# Patient Record
Sex: FEMALE | Race: WHITE | NOT HISPANIC OR LATINO
[De-identification: names, ages, dates, MRNs, and addresses within clinical notes are randomized per-mention and may not be internally consistent; named-entity substitution may affect disease eponyms.]

---

## 2018-09-24 ENCOUNTER — RX RENEWAL (OUTPATIENT)
Age: 58
End: 2018-09-24

## 2018-11-13 ENCOUNTER — RECORD ABSTRACTING (OUTPATIENT)
Age: 58
End: 2018-11-13

## 2018-11-13 DIAGNOSIS — F17.200 NICOTINE DEPENDENCE, UNSPECIFIED, UNCOMPLICATED: ICD-10-CM

## 2018-11-14 ENCOUNTER — APPOINTMENT (OUTPATIENT)
Dept: ENDOCRINOLOGY | Facility: CLINIC | Age: 58
End: 2018-11-14
Payer: COMMERCIAL

## 2018-11-14 VITALS
SYSTOLIC BLOOD PRESSURE: 140 MMHG | WEIGHT: 148 LBS | HEART RATE: 87 BPM | HEIGHT: 68 IN | BODY MASS INDEX: 22.43 KG/M2 | DIASTOLIC BLOOD PRESSURE: 80 MMHG

## 2018-11-14 DIAGNOSIS — R73.01 IMPAIRED FASTING GLUCOSE: ICD-10-CM

## 2018-11-14 DIAGNOSIS — E55.9 VITAMIN D DEFICIENCY, UNSPECIFIED: ICD-10-CM

## 2018-11-14 PROCEDURE — 99213 OFFICE O/P EST LOW 20 MIN: CPT

## 2018-11-30 PROBLEM — R73.01 IMPAIRED FASTING GLUCOSE: Status: ACTIVE | Noted: 2018-11-13

## 2018-11-30 PROBLEM — E55.9 VITAMIN D DEFICIENCY: Status: ACTIVE | Noted: 2018-11-13

## 2019-01-02 ENCOUNTER — RX RENEWAL (OUTPATIENT)
Age: 59
End: 2019-01-02

## 2019-04-03 ENCOUNTER — RESULT CHARGE (OUTPATIENT)
Age: 59
End: 2019-04-03

## 2019-05-08 ENCOUNTER — APPOINTMENT (OUTPATIENT)
Dept: ENDOCRINOLOGY | Facility: CLINIC | Age: 59
End: 2019-05-08

## 2019-05-15 ENCOUNTER — APPOINTMENT (OUTPATIENT)
Dept: ENDOCRINOLOGY | Facility: CLINIC | Age: 59
End: 2019-05-15

## 2019-07-22 ENCOUNTER — TRANSCRIPTION ENCOUNTER (OUTPATIENT)
Age: 59
End: 2019-07-22

## 2019-09-15 ENCOUNTER — TRANSCRIPTION ENCOUNTER (OUTPATIENT)
Age: 59
End: 2019-09-15

## 2019-10-07 ENCOUNTER — TRANSCRIPTION ENCOUNTER (OUTPATIENT)
Age: 59
End: 2019-10-07

## 2019-11-12 ENCOUNTER — TRANSCRIPTION ENCOUNTER (OUTPATIENT)
Age: 59
End: 2019-11-12

## 2019-11-19 ENCOUNTER — TRANSCRIPTION ENCOUNTER (OUTPATIENT)
Age: 59
End: 2019-11-19

## 2019-12-15 ENCOUNTER — TRANSCRIPTION ENCOUNTER (OUTPATIENT)
Age: 59
End: 2019-12-15

## 2020-03-11 ENCOUNTER — TRANSCRIPTION ENCOUNTER (OUTPATIENT)
Age: 60
End: 2020-03-11

## 2020-04-20 ENCOUNTER — APPOINTMENT (OUTPATIENT)
Dept: DISASTER EMERGENCY | Facility: CLINIC | Age: 60
End: 2020-04-20
Payer: COMMERCIAL

## 2020-04-20 VITALS
OXYGEN SATURATION: 97 % | DIASTOLIC BLOOD PRESSURE: 82 MMHG | SYSTOLIC BLOOD PRESSURE: 126 MMHG | TEMPERATURE: 98.3 F | HEART RATE: 72 BPM | RESPIRATION RATE: 20 BRPM

## 2020-04-20 DIAGNOSIS — Z86.39 PERSONAL HISTORY OF OTHER ENDOCRINE, NUTRITIONAL AND METABOLIC DISEASE: ICD-10-CM

## 2020-04-20 DIAGNOSIS — Z20.828 CONTACT WITH AND (SUSPECTED) EXPOSURE TO OTHER VIRAL COMMUNICABLE DISEASES: ICD-10-CM

## 2020-04-20 DIAGNOSIS — F17.200 NICOTINE DEPENDENCE, UNSPECIFIED, UNCOMPLICATED: ICD-10-CM

## 2020-04-20 DIAGNOSIS — Z03.818 ENCOUNTER FOR OBSERVATION FOR SUSPECTED EXPOSURE TO OTHER BIOLOGICAL AGENTS RULED OUT: ICD-10-CM

## 2020-04-20 PROCEDURE — 99213 OFFICE O/P EST LOW 20 MIN: CPT

## 2020-04-21 LAB — SARS-COV-2 N GENE NPH QL NAA+PROBE: NOT DETECTED

## 2020-05-13 PROBLEM — Z86.39 HISTORY OF THYROID DISORDER: Status: RESOLVED | Noted: 2020-04-20 | Resolved: 2020-05-13

## 2020-05-13 PROBLEM — Z20.828 SUSPECTED COVID-19 VIRUS INFECTION: Status: ACTIVE | Noted: 2020-04-20

## 2020-05-13 PROBLEM — F17.200 CURRENT SMOKER: Status: ACTIVE | Noted: 2020-04-20

## 2020-05-13 PROBLEM — Z03.818 ENCOUNTER FOR OBSERVATION FOR SUSPECTED EXPOSURE TO OTHER BIOLOGICAL AGENTS RULED OUT: Status: ACTIVE | Noted: 2020-05-13

## 2020-05-13 NOTE — HISTORY OF PRESENT ILLNESS
[Patient presents to the office today for COVID-19 evaluation and testing.] : Patient presents to the office today for COVID-19 evaluation and testing. [Patient has been pre-screened by RN at call center for appointment today with our facility.] : Patient has been pre-screened by RN at call center for appointment today with our facility. [] : no dizziness on standing [None Known] : none known [None] : none [Clear] : clear [moderate] : moderate wheezing [Speaks in full sentences] : speaks in full sentences [Normal O2 sat at rest] : normal O2 sat at rest [Grossly normal, interacts, not tired or weak] : grossly normal, interacts, not tired or weak [COVID-19 testing ordered and specimen obtained] : COVID-19 testing ordered and specimen obtained [FreeTextEntry1] : one month of cough, treated for bronchitis at the time  .  Reports only a cough, no SOB .  Pt. on nebs every four hours. [TextBox_48] : no n/v/d [TextBox_66] : thyroid condition, asthma  [TextBox_78] : limited physical exam  [TextBox_97] : 97% [TextBox_107] : Patient education provided for COVID19.  Explained increased symptoms and SOB ,the patient will go to the ED.  Discussed isolation precautions and handwashing techniques.  Social distancing reviewed.  Utilize Tylenol for fever over 100.4.  No signs of cardiovascular decompensation.  Patient understands the instructions.\par Test results may take up to 3-7 days to return.  Discussed the possibility of false negative results.  Instructed to self quarantine and must remain quarantined x 14 days and no fever for 3 days without antifever medications.  All patients close contacts should also self quarantine.  Social distancing once quarantine is completed.  If the patient has symptoms of chest discomfort, severe shortness of breath at rest, worsening shortness of breath with minimal exertion, new or worsening wheezing, dizziness were instructed to seek immediate medical evaluation.\par \par In addition, she was instructed to call her  PCP for potential asthma treatment.  \par \par \par \par

## 2020-07-21 ENCOUNTER — TRANSCRIPTION ENCOUNTER (OUTPATIENT)
Age: 60
End: 2020-07-21

## 2020-08-11 ENCOUNTER — APPOINTMENT (OUTPATIENT)
Dept: PULMONOLOGY | Facility: CLINIC | Age: 60
End: 2020-08-11
Payer: COMMERCIAL

## 2020-08-11 VITALS
HEIGHT: 68 IN | OXYGEN SATURATION: 96 % | DIASTOLIC BLOOD PRESSURE: 56 MMHG | SYSTOLIC BLOOD PRESSURE: 122 MMHG | BODY MASS INDEX: 22.73 KG/M2 | WEIGHT: 150 LBS | HEART RATE: 76 BPM

## 2020-08-11 DIAGNOSIS — Z86.59 PERSONAL HISTORY OF OTHER MENTAL AND BEHAVIORAL DISORDERS: ICD-10-CM

## 2020-08-11 DIAGNOSIS — Z80.1 FAMILY HISTORY OF MALIGNANT NEOPLASM OF TRACHEA, BRONCHUS AND LUNG: ICD-10-CM

## 2020-08-11 PROCEDURE — G0296 VISIT TO DETERM LDCT ELIG: CPT

## 2020-08-11 PROCEDURE — 99204 OFFICE O/P NEW MOD 45 MIN: CPT | Mod: 25

## 2020-08-11 PROCEDURE — 99406 BEHAV CHNG SMOKING 3-10 MIN: CPT

## 2020-08-11 RX ORDER — PREDNISONE 50 MG/1
50 TABLET ORAL
Qty: 3 | Refills: 0 | Status: DISCONTINUED | COMMUNITY
Start: 2018-11-11 | End: 2020-08-11

## 2020-08-11 RX ORDER — LEVOTHYROXINE SODIUM 0.17 MG/1
TABLET ORAL
Refills: 0 | Status: DISCONTINUED | COMMUNITY
End: 2020-08-11

## 2020-08-11 NOTE — PHYSICAL EXAM
[No Acute Distress] : no acute distress [Normal Oropharynx] : normal oropharynx [No Neck Mass] : no neck mass [Normal Appearance] : normal appearance [Normal Rate/Rhythm] : normal rate/rhythm [Normal S1, S2] : normal s1, s2 [No Murmurs] : no murmurs [No Resp Distress] : no resp distress [No Abnormalities] : no abnormalities [Clear to Auscultation Bilaterally] : clear to auscultation bilaterally [Benign] : benign [Normal Gait] : normal gait [No Clubbing] : no clubbing [No Edema] : no edema [No Focal Deficits] : no focal deficits [Normal Color/ Pigmentation] : normal color/ pigmentation [Oriented x3] : oriented x3 [Normal Affect] : normal affect

## 2020-08-11 NOTE — COUNSELING
[Cessation strategies including cessation program discussed] : Cessation strategies including cessation program discussed [Risk of tobacco use and health benefits of smoking cessation discussed] : Risk of tobacco use and health benefits of smoking cessation discussed [Tobacco Use Cessation Intermediate Greater Than 3 Minutes Up to 10 Minutes] : Tobacco Use Cessation Intermediate Greater Than 3 Minutes Up to 10 Minutes [Use of nicotine replacement therapies and other medications discussed] : Use of nicotine replacement therapies and other medications discussed [ - Annual Lung Cancer Screening/Share Decision Making Discussion] : Annual Lung Cancer Screening/Share Decision Making Discussion. (I have advised this patient to have a Low Dose CT (LDCT) scan of the lungs and have discussed the following with the patient in a shared decision making discussion:   Benefits of Detection and Early Treatment: There is adequate evidence that annual screening for lung cancer with LDCT in a population of high-risk persons can prevent a substantial number of lung cancer–related deaths. The magnitude of benefit depends on the individual patient's risk for lung cancer, as those who are at highest risk are most likely to benefit. Screening cannot prevent most lung cancer–related deaths, and does not replace smoking cessation. Harms of Detection and Early Intervention and Treatment: The harms associated with LDCT screening include false-negative and false-positive results, incidental findings, over diagnosis, and radiation exposure. False-positive LDCT results occur in a substantial proportion of screened persons; 95% of all positive results do not lead to a diagnosis of cancer. In a high-quality screening program, further imaging can resolve most false-positive results; however, some patients may require invasive procedures. Radiation harms, including cancer resulting from cumulative exposure to radiation, vary depending on the age at the start of screening; the number of scans received; and the person's exposure to other sources of radiation, particularly other medical imaging.)

## 2020-08-11 NOTE — CONSULT LETTER
[Consult Letter:] : I had the pleasure of evaluating your patient, [unfilled]. [Dear  ___] : Dear  [unfilled], [Please see my note below.] : Please see my note below. [Sincerely,] : Sincerely, [Consult Closing:] : Thank you very much for allowing me to participate in the care of this patient.  If you have any questions, please do not hesitate to contact me. [DrDominguez  ___] : Dr. SPICER

## 2020-09-17 NOTE — HISTORY OF PRESENT ILLNESS
[TextBox_4] : 30 pack year smoker whose mother  of lung cancer\par Recurrent cough and congestion with HENRIQUEZ\par Responsive to steroids in the past \par CXR on 20: Clear

## 2020-09-18 ENCOUNTER — APPOINTMENT (OUTPATIENT)
Dept: PULMONOLOGY | Facility: CLINIC | Age: 60
End: 2020-09-18

## 2020-09-19 ENCOUNTER — LABORATORY RESULT (OUTPATIENT)
Age: 60
End: 2020-09-19

## 2020-09-19 ENCOUNTER — APPOINTMENT (OUTPATIENT)
Dept: DISASTER EMERGENCY | Facility: CLINIC | Age: 60
End: 2020-09-19

## 2020-09-22 ENCOUNTER — APPOINTMENT (OUTPATIENT)
Dept: PULMONOLOGY | Facility: CLINIC | Age: 60
End: 2020-09-22
Payer: COMMERCIAL

## 2020-09-22 VITALS — WEIGHT: 152 LBS | HEIGHT: 68 IN | BODY MASS INDEX: 23.04 KG/M2

## 2020-09-22 VITALS
RESPIRATION RATE: 16 BRPM | DIASTOLIC BLOOD PRESSURE: 80 MMHG | OXYGEN SATURATION: 96 % | HEART RATE: 83 BPM | SYSTOLIC BLOOD PRESSURE: 124 MMHG

## 2020-09-22 DIAGNOSIS — J30.9 ALLERGIC RHINITIS, UNSPECIFIED: ICD-10-CM

## 2020-09-22 DIAGNOSIS — K21.9 GASTRO-ESOPHAGEAL REFLUX DISEASE W/OUT ESOPHAGITIS: ICD-10-CM

## 2020-09-22 PROCEDURE — 85018 HEMOGLOBIN: CPT | Mod: QW

## 2020-09-22 PROCEDURE — 99214 OFFICE O/P EST MOD 30 MIN: CPT | Mod: 25

## 2020-09-22 PROCEDURE — 94727 GAS DIL/WSHOT DETER LNG VOL: CPT

## 2020-09-22 PROCEDURE — 94729 DIFFUSING CAPACITY: CPT

## 2020-09-22 PROCEDURE — 94010 BREATHING CAPACITY TEST: CPT

## 2020-09-22 RX ORDER — PREDNISONE 20 MG/1
20 TABLET ORAL
Qty: 10 | Refills: 0 | Status: DISCONTINUED | COMMUNITY
Start: 2020-07-21

## 2020-09-22 RX ORDER — AZITHROMYCIN 250 MG/1
250 TABLET, FILM COATED ORAL
Qty: 6 | Refills: 0 | Status: DISCONTINUED | COMMUNITY
Start: 2020-04-21

## 2020-09-22 RX ORDER — BENZONATATE 100 MG/1
100 CAPSULE ORAL
Qty: 30 | Refills: 0 | Status: DISCONTINUED | COMMUNITY
Start: 2020-07-21

## 2020-09-22 NOTE — PHYSICAL EXAM
[No Acute Distress] : no acute distress [Normal Oropharynx] : normal oropharynx [Normal Appearance] : normal appearance [No Neck Mass] : no neck mass [Normal Rate/Rhythm] : normal rate/rhythm [Normal S1, S2] : normal s1, s2 [No Murmurs] : no murmurs [No Resp Distress] : no resp distress [Clear to Auscultation Bilaterally] : clear to auscultation bilaterally [No Abnormalities] : no abnormalities [Benign] : benign [Normal Gait] : normal gait [No Clubbing] : no clubbing [No Edema] : no edema [Normal Color/ Pigmentation] : normal color/ pigmentation [No Focal Deficits] : no focal deficits [Oriented x3] : oriented x3 [Normal Affect] : normal affect

## 2020-09-22 NOTE — CONSULT LETTER
[Dear  ___] : Dear  [unfilled], [Consult Letter:] : I had the pleasure of evaluating your patient, [unfilled]. [Please see my note below.] : Please see my note below. [Consult Closing:] : Thank you very much for allowing me to participate in the care of this patient.  If you have any questions, please do not hesitate to contact me. [Sincerely,] : Sincerely, [DrDominguez  ___] : Dr. SPICER

## 2020-09-22 NOTE — HISTORY OF PRESENT ILLNESS
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

## 2020-11-03 ENCOUNTER — LABORATORY RESULT (OUTPATIENT)
Age: 60
End: 2020-11-03

## 2020-11-03 ENCOUNTER — NON-APPOINTMENT (OUTPATIENT)
Age: 60
End: 2020-11-03

## 2020-11-03 ENCOUNTER — APPOINTMENT (OUTPATIENT)
Dept: FAMILY MEDICINE | Facility: CLINIC | Age: 60
End: 2020-11-03
Payer: COMMERCIAL

## 2020-11-03 VITALS — BODY MASS INDEX: 23.26 KG/M2 | OXYGEN SATURATION: 98 % | TEMPERATURE: 98.4 F | WEIGHT: 153 LBS | HEART RATE: 98 BPM

## 2020-11-03 VITALS — HEART RATE: 92 BPM | DIASTOLIC BLOOD PRESSURE: 70 MMHG | SYSTOLIC BLOOD PRESSURE: 110 MMHG

## 2020-11-03 LAB
BILIRUB UR QL STRIP: NORMAL
GLUCOSE UR-MCNC: NORMAL
HCG UR QL: 0.2 EU/DL
HGB UR QL STRIP.AUTO: NORMAL
KETONES UR-MCNC: NORMAL
LEUKOCYTE ESTERASE UR QL STRIP: NORMAL
NITRITE UR QL STRIP: NORMAL
PH UR STRIP: 5.5
PROT UR STRIP-MCNC: NORMAL
SP GR UR STRIP: 1.03

## 2020-11-03 PROCEDURE — 36415 COLL VENOUS BLD VENIPUNCTURE: CPT

## 2020-11-03 PROCEDURE — 99396 PREV VISIT EST AGE 40-64: CPT | Mod: 25

## 2020-11-03 PROCEDURE — 90686 IIV4 VACC NO PRSV 0.5 ML IM: CPT

## 2020-11-03 PROCEDURE — 81003 URINALYSIS AUTO W/O SCOPE: CPT | Mod: QW

## 2020-11-03 PROCEDURE — 99072 ADDL SUPL MATRL&STAF TM PHE: CPT

## 2020-11-03 PROCEDURE — G0008: CPT

## 2020-11-03 PROCEDURE — 93000 ELECTROCARDIOGRAM COMPLETE: CPT

## 2020-11-03 NOTE — HISTORY OF PRESENT ILLNESS
[FreeTextEntry1] : pt. here for cpe. [de-identified] : pt having pain in both shoulders after massage

## 2020-11-04 ENCOUNTER — APPOINTMENT (OUTPATIENT)
Dept: FAMILY MEDICINE | Facility: CLINIC | Age: 60
End: 2020-11-04
Payer: COMMERCIAL

## 2020-11-04 VITALS — OXYGEN SATURATION: 98 % | TEMPERATURE: 98.2 F | HEART RATE: 102 BPM

## 2020-11-04 LAB
ALBUMIN SERPL ELPH-MCNC: 5.1 G/DL
ALP BLD-CCNC: 93 U/L
ALT SERPL-CCNC: 40 U/L
ANION GAP SERPL CALC-SCNC: 16 MMOL/L
AST SERPL-CCNC: 39 U/L
BASOPHILS # BLD AUTO: 0.04 K/UL
BASOPHILS NFR BLD AUTO: 0.6 %
BILIRUB SERPL-MCNC: 0.5 MG/DL
BUN SERPL-MCNC: 12 MG/DL
CALCIUM SERPL-MCNC: 9.7 MG/DL
CHLORIDE SERPL-SCNC: 102 MMOL/L
CHOLEST SERPL-MCNC: 289 MG/DL
CO2 SERPL-SCNC: 25 MMOL/L
CREAT SERPL-MCNC: 0.94 MG/DL
EOSINOPHIL # BLD AUTO: 0.07 K/UL
EOSINOPHIL NFR BLD AUTO: 1.1 %
ESTIMATED AVERAGE GLUCOSE: 114 MG/DL
GLUCOSE SERPL-MCNC: 113 MG/DL
HBA1C MFR BLD HPLC: 5.6 %
HCT VFR BLD CALC: 46.2 %
HDLC SERPL-MCNC: 61 MG/DL
HGB BLD-MCNC: 14.9 G/DL
IMM GRANULOCYTES NFR BLD AUTO: 0.2 %
LDLC SERPL CALC-MCNC: 192 MG/DL
LYMPHOCYTES # BLD AUTO: 1.55 K/UL
LYMPHOCYTES NFR BLD AUTO: 24.1 %
MAN DIFF?: NORMAL
MCHC RBC-ENTMCNC: 32.3 GM/DL
MCHC RBC-ENTMCNC: 37 PG
MCV RBC AUTO: 114.6 FL
MONOCYTES # BLD AUTO: 0.38 K/UL
MONOCYTES NFR BLD AUTO: 5.9 %
NEUTROPHILS # BLD AUTO: 4.37 K/UL
NEUTROPHILS NFR BLD AUTO: 68.1 %
NONHDLC SERPL-MCNC: 228 MG/DL
PLATELET # BLD AUTO: 212 K/UL
POTASSIUM SERPL-SCNC: 4 MMOL/L
PROT SERPL-MCNC: 7.6 G/DL
RBC # BLD: 4.03 M/UL
RBC # FLD: 12.6 %
SODIUM SERPL-SCNC: 143 MMOL/L
T4 FREE SERPL-MCNC: 0.9 NG/DL
TRIGL SERPL-MCNC: 182 MG/DL
TSH SERPL-ACNC: 20 UIU/ML
WBC # FLD AUTO: 6.42 K/UL

## 2020-11-04 PROCEDURE — 99213 OFFICE O/P EST LOW 20 MIN: CPT | Mod: 25

## 2020-11-04 PROCEDURE — 99072 ADDL SUPL MATRL&STAF TM PHE: CPT

## 2020-12-07 ENCOUNTER — APPOINTMENT (OUTPATIENT)
Dept: FAMILY MEDICINE | Facility: CLINIC | Age: 60
End: 2020-12-07
Payer: COMMERCIAL

## 2020-12-07 VITALS — TEMPERATURE: 98.7 F | OXYGEN SATURATION: 94 % | HEART RATE: 113 BPM

## 2020-12-07 VITALS — DIASTOLIC BLOOD PRESSURE: 70 MMHG | SYSTOLIC BLOOD PRESSURE: 110 MMHG | HEART RATE: 90 BPM

## 2020-12-07 PROCEDURE — 99213 OFFICE O/P EST LOW 20 MIN: CPT

## 2020-12-07 PROCEDURE — 99072 ADDL SUPL MATRL&STAF TM PHE: CPT

## 2020-12-07 NOTE — HISTORY OF PRESENT ILLNESS
[FreeTextEntry1] : pt.  here for thyroid re check [de-identified] : pt still tired slight improvement

## 2020-12-08 ENCOUNTER — LABORATORY RESULT (OUTPATIENT)
Age: 60
End: 2020-12-08

## 2020-12-09 ENCOUNTER — LABORATORY RESULT (OUTPATIENT)
Age: 60
End: 2020-12-09

## 2020-12-09 ENCOUNTER — APPOINTMENT (OUTPATIENT)
Dept: FAMILY MEDICINE | Facility: CLINIC | Age: 60
End: 2020-12-09
Payer: COMMERCIAL

## 2020-12-09 LAB — TSH SERPL-ACNC: 0.09 UIU/ML

## 2020-12-09 PROCEDURE — 36415 COLL VENOUS BLD VENIPUNCTURE: CPT

## 2020-12-09 PROCEDURE — 99072 ADDL SUPL MATRL&STAF TM PHE: CPT

## 2020-12-10 LAB — TSH SERPL-ACNC: 0.05 UIU/ML

## 2020-12-12 ENCOUNTER — EMERGENCY (EMERGENCY)
Facility: HOSPITAL | Age: 60
LOS: 1 days | Discharge: HOME | End: 2020-12-12
Attending: STUDENT IN AN ORGANIZED HEALTH CARE EDUCATION/TRAINING PROGRAM
Payer: COMMERCIAL
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
## 2020-12-12 LAB
ALBUMIN SERPL ELPH-MCNC: 4.3 G/DL — SIGNIFICANT CHANGE UP (ref 3.3–5.2)
ALP SERPL-CCNC: 97 U/L — SIGNIFICANT CHANGE UP (ref 40–120)
ALT FLD-CCNC: 43 U/L — HIGH
ANION GAP SERPL CALC-SCNC: 16 MMOL/L — SIGNIFICANT CHANGE UP (ref 5–17)
ANISOCYTOSIS BLD QL: SLIGHT — SIGNIFICANT CHANGE UP
APTT BLD: 34.5 SEC — SIGNIFICANT CHANGE UP (ref 27.5–35.5)
AST SERPL-CCNC: 38 U/L — HIGH
BASOPHILS # BLD AUTO: 0 K/UL — SIGNIFICANT CHANGE UP (ref 0–0.2)
BASOPHILS NFR BLD AUTO: 0 % — SIGNIFICANT CHANGE UP (ref 0–2)
BILIRUB SERPL-MCNC: <0.2 MG/DL — LOW (ref 0.4–2)
BLD GP AB SCN SERPL QL: SIGNIFICANT CHANGE UP
BUN SERPL-MCNC: 13 MG/DL — SIGNIFICANT CHANGE UP (ref 8–20)
CALCIUM SERPL-MCNC: 8.9 MG/DL — SIGNIFICANT CHANGE UP (ref 8.6–10.2)
CHLORIDE SERPL-SCNC: 108 MMOL/L — HIGH (ref 98–107)
CO2 SERPL-SCNC: 20 MMOL/L — LOW (ref 22–29)
CREAT SERPL-MCNC: 0.77 MG/DL — SIGNIFICANT CHANGE UP (ref 0.5–1.3)
EOSINOPHIL # BLD AUTO: 0.22 K/UL — SIGNIFICANT CHANGE UP (ref 0–0.5)
EOSINOPHIL NFR BLD AUTO: 3.6 % — SIGNIFICANT CHANGE UP (ref 0–6)
ETHANOL SERPL-MCNC: 380 MG/DL — HIGH (ref 0–9)
GLUCOSE SERPL-MCNC: 101 MG/DL — HIGH (ref 70–99)
HCT VFR BLD CALC: 42.5 % — SIGNIFICANT CHANGE UP (ref 34.5–45)
HGB BLD-MCNC: 14.3 G/DL — SIGNIFICANT CHANGE UP (ref 11.5–15.5)
INR BLD: 0.87 RATIO — LOW (ref 0.88–1.16)
LIDOCAIN IGE QN: 81 U/L — HIGH (ref 22–51)
LYMPHOCYTES # BLD AUTO: 1.82 K/UL — SIGNIFICANT CHANGE UP (ref 1–3.3)
LYMPHOCYTES # BLD AUTO: 30.3 % — SIGNIFICANT CHANGE UP (ref 13–44)
MACROCYTES BLD QL: SLIGHT — SIGNIFICANT CHANGE UP
MANUAL SMEAR VERIFICATION: SIGNIFICANT CHANGE UP
MCHC RBC-ENTMCNC: 33.6 GM/DL — SIGNIFICANT CHANGE UP (ref 32–36)
MCHC RBC-ENTMCNC: 37.1 PG — HIGH (ref 27–34)
MCV RBC AUTO: 110.4 FL — HIGH (ref 80–100)
MONOCYTES # BLD AUTO: 0.16 K/UL — SIGNIFICANT CHANGE UP (ref 0–0.9)
MONOCYTES NFR BLD AUTO: 2.7 % — SIGNIFICANT CHANGE UP (ref 2–14)
NEUTROPHILS # BLD AUTO: 3.27 K/UL — SIGNIFICANT CHANGE UP (ref 1.8–7.4)
NEUTROPHILS NFR BLD AUTO: 54.5 % — SIGNIFICANT CHANGE UP (ref 43–77)
PLAT MORPH BLD: NORMAL — SIGNIFICANT CHANGE UP
PLATELET # BLD AUTO: 197 K/UL — SIGNIFICANT CHANGE UP (ref 150–400)
POIKILOCYTOSIS BLD QL AUTO: SLIGHT — SIGNIFICANT CHANGE UP
POLYCHROMASIA BLD QL SMEAR: SLIGHT — SIGNIFICANT CHANGE UP
POTASSIUM SERPL-MCNC: 3.5 MMOL/L — SIGNIFICANT CHANGE UP (ref 3.5–5.3)
POTASSIUM SERPL-SCNC: 3.5 MMOL/L — SIGNIFICANT CHANGE UP (ref 3.5–5.3)
PROT SERPL-MCNC: 7 G/DL — SIGNIFICANT CHANGE UP (ref 6.6–8.7)
PROTHROM AB SERPL-ACNC: 10.2 SEC — LOW (ref 10.6–13.6)
RBC # BLD: 3.85 M/UL — SIGNIFICANT CHANGE UP (ref 3.8–5.2)
RBC # FLD: 11.6 % — SIGNIFICANT CHANGE UP (ref 10.3–14.5)
RBC BLD AUTO: SIGNIFICANT CHANGE UP
ROULEAUX BLD QL SMEAR: PRESENT
SODIUM SERPL-SCNC: 144 MMOL/L — SIGNIFICANT CHANGE UP (ref 135–145)
VARIANT LYMPHS # BLD: 8.9 % — HIGH (ref 0–6)
WBC # BLD: 6 K/UL — SIGNIFICANT CHANGE UP (ref 3.8–10.5)
WBC # FLD AUTO: 6 K/UL — SIGNIFICANT CHANGE UP (ref 3.8–10.5)

## 2020-12-12 PROCEDURE — 86850 RBC ANTIBODY SCREEN: CPT

## 2020-12-12 PROCEDURE — 85610 PROTHROMBIN TIME: CPT

## 2020-12-12 PROCEDURE — 72125 CT NECK SPINE W/O DYE: CPT | Mod: 26

## 2020-12-12 PROCEDURE — 80053 COMPREHEN METABOLIC PANEL: CPT

## 2020-12-12 PROCEDURE — 74177 CT ABD & PELVIS W/CONTRAST: CPT | Mod: 26

## 2020-12-12 PROCEDURE — 12002 RPR S/N/AX/GEN/TRNK2.6-7.5CM: CPT

## 2020-12-12 PROCEDURE — 71045 X-RAY EXAM CHEST 1 VIEW: CPT

## 2020-12-12 PROCEDURE — 74177 CT ABD & PELVIS W/CONTRAST: CPT

## 2020-12-12 PROCEDURE — 83690 ASSAY OF LIPASE: CPT

## 2020-12-12 PROCEDURE — 71260 CT THORAX DX C+: CPT | Mod: 26

## 2020-12-12 PROCEDURE — 85730 THROMBOPLASTIN TIME PARTIAL: CPT

## 2020-12-12 PROCEDURE — 71045 X-RAY EXAM CHEST 1 VIEW: CPT | Mod: 26

## 2020-12-12 PROCEDURE — 80307 DRUG TEST PRSMV CHEM ANLYZR: CPT

## 2020-12-12 PROCEDURE — 86900 BLOOD TYPING SEROLOGIC ABO: CPT

## 2020-12-12 PROCEDURE — 83605 ASSAY OF LACTIC ACID: CPT

## 2020-12-12 PROCEDURE — 36415 COLL VENOUS BLD VENIPUNCTURE: CPT

## 2020-12-12 PROCEDURE — 70450 CT HEAD/BRAIN W/O DYE: CPT | Mod: 26

## 2020-12-12 PROCEDURE — 71260 CT THORAX DX C+: CPT

## 2020-12-12 PROCEDURE — 85025 COMPLETE CBC W/AUTO DIFF WBC: CPT

## 2020-12-12 PROCEDURE — 99285 EMERGENCY DEPT VISIT HI MDM: CPT | Mod: 25

## 2020-12-12 PROCEDURE — 72125 CT NECK SPINE W/O DYE: CPT

## 2020-12-12 PROCEDURE — 70450 CT HEAD/BRAIN W/O DYE: CPT

## 2020-12-12 PROCEDURE — 86901 BLOOD TYPING SEROLOGIC RH(D): CPT

## 2020-12-12 PROCEDURE — 99282 EMERGENCY DEPT VISIT SF MDM: CPT

## 2020-12-12 NOTE — ED PROVIDER NOTE - ATTENDING CONTRIBUTION TO CARE
I performed a face to face history and physical exam of the patient and discussed their management with the student/resident/ACP. I reviewed the student/resident/ACP's note and agree with the documented findings and plan of care.    Pt states that she was at home in her bedroom when she fell. Fall was unwitnessed. Pt admits to drinking but is unsure how she fell. +LOc. no other complaints.    physical - rrr. ctab. abd - soft, mild lower abd ttp. no edema. no rash. neuro intact. R temporal scalp laceration approx. 3 cm. no midline ttp.    plan - trauma team activation. labs and imaging reviewed. laceration stapled. Pt cleared by trauma. will d/c with outpatient f/up.

## 2020-12-12 NOTE — ED PROVIDER NOTE - OBJECTIVE STATEMENT
Pt is a 67 y/o F W/no reported PMHx presents c/o fall w/ALOOB.  Pt was at home drinking rum when she had an unwitnessed fall in her bedroom.  Pt does not recall the fall, denies AC.  Pt was at home with .  Pt tearful during interview, endorses BLLQ pain.

## 2020-12-12 NOTE — ED PROVIDER NOTE - PATIENT PORTAL LINK FT
You can access the FollowMyHealth Patient Portal offered by [facility] by registering at the following website: [email or web address]. By joining [facility]’s FollowMyHealth portal, you will also be able to view your health information using other applications (apps) compatible with our system.

## 2020-12-12 NOTE — H&P ADULT - HISTORY OF PRESENT ILLNESS
Pt is a is a woman in her 50's brought in by ambulance after unwitnessed fall in her bedroom +ETOH, +LOC. Patient comes in GCS 15, ABCS intact, A&OX3, c-collar in place moving all extremities. Pt is a is 67 y/o F  brought in by EMS after unwitnessed fall in her bedroom +ETOH, +LOC, does not recall events. Patient comes in GCS 15, ABC'S intact, A&OX3, c-collar in place moving all extremities. Patient has no complaints of pain, only visible injury small 2 cm laceration to right forehead into scalp. Patient denies cp, sob, sick contacts, recent travel.

## 2020-12-12 NOTE — ED ADULT TRIAGE NOTE - CHIEF COMPLAINT QUOTE
unwitnessed fall hitting head, + LOC, denied blood thinners, abrasion to FA noted. pt in C collar. code trauma B called , pt brought to trauma room

## 2020-12-12 NOTE — ED PROVIDER NOTE - PROGRESS NOTE DETAILS
Pt clinically sober, ambulating.  Pt reassessed, pt feeling better at this time, vss, pt able to walk, talk and vocalized plan of action. Discussed in depth and explained to pt in depth the next steps that need to be taking including proper follow up with PCP or specialists. All incidental findings were discussed with pt as well. Pt verbalized their concerns and all questions were answered. Pt understands dispo and wants discharge. Given good instructions when to return to ED and importance of f/u.

## 2020-12-12 NOTE — ED PROVIDER NOTE - NSFOLLOWUPINSTRUCTIONS_ED_ALL_ED_FT
-- Please follow up with your doctor(s) within the next 3 days, but seek medical attention sooner if your symptoms persist or worsen.  Please call tomorrow for an appointment.  If you cannot follow-up with your primary doctor please return to the ED for any urgent issues.    -- You were given a copy of your labs and imaging.  Please go over these with your doctor(s).     -- If you have any worsening of symptoms or any other concerns please see your doctor or return to the nearest emergency room immediately.    -- Please continue taking your home medications as directed.  Do not use alcohol when taking any medication (especially antibiotics, tylenol or other pain medication) unless you check with the doctor or pharmacist.    **************************************************************************************************************  Do not get the area wet for 2 days. No sports for 2 weeks.    Stitches, Staples, or Adhesive Wound Closure  Doctors use stitches (sutures), staples, and certain glue (skin adhesives) to hold your skin together while it heals (wound closure). You may need this treatment after you have surgery or if you cut your skin accidentally. These methods help your skin heal more quickly. They also make it less likely that you will have a scar.    Terry     When surgical staples are used to close a wound, the edges of your skin on both sides of the wound are brought close together. A staple is placed across the wound, and an instrument secures the edges together. Staples are often used to close surgical cuts (incisions).    Staples are faster to use than sutures, and they cause less reaction from your skin. Staples need to be removed using a tool that bends the staples away from your skin.    How do I care for my wound closure?  Take medicines only as told by your doctor.  If you were prescribed an antibiotic medicine for your wound, finish it all even if you start to feel better.  Use ointments or creams only as told by your doctor.  Wash your hands with soap and water before and after touching your wound.  Do not soak your wound in water. Do not take baths, swim, or use a hot tub until your doctor says it is okay.  Ask your doctor when you can start showering. Cover your wound if told by your doctor.  Do not take out your own sutures or staples.  Do not pick at your wound. Picking can cause an infection.  Keep all follow-up visits as told by your doctor. This is important.  How long will I have my wound closure?  Nonabsorbable sutures and staples must be removed. The location of the wound will determine how long they stay in. This can range from several days to a couple of weeks.  **********************  YOUR WOUND NEEDS FOLLOW UP FOR A WOUND CHECK, STAPLE REMOVAL IN  5-7 DAYS  **********************      Contact your doctor if:    You have a fever.  You have chills.  You have redness, puffiness (swelling), or pain at the site of your wound.  You have fluid, blood, or pus coming from your wound.  There is a bad smell coming from your wound.  The skin edges of your wound start to separate after your sutures have been removed.  Your wound becomes thick, raised, and darker in color after your sutures come out (scarring).    This information is not intended to replace advice given to you by your health care provider. Make sure you discuss any questions you have with your health care provider.

## 2020-12-12 NOTE — H&P ADULT - ATTENDING COMMENTS
I have seen and examined the patient  fall down head lac,  On arrival ABCD intact HD normal  Pan scanned without injuries  Scalp lac repair by ED (help appreciated)  Dispo per Ed

## 2020-12-12 NOTE — ED PROVIDER NOTE - PHYSICAL EXAMINATION
General: well appearing, interactive, well nourished, NAD  HEENT: 3 cm laceration over right forehead in hairline, pupils equal and reactive, normal external ears bilaterally   Cardiac: RRR, no MRG appreciated  Resp: lungs clear to auscultation bilaterally, symmetric chest wall rise  Abd: soft, +ttp RLQ/LLQ, nondistended,   : no CVA tenderness  Neuro: Moving all extremities  Skin:  normal color for race

## 2020-12-12 NOTE — PROGRESS NOTE ADULT - SUBJECTIVE AND OBJECTIVE BOX
Tertiary Trauma Survey (TTS)    Date of TTS: 12-12-20 @ 22:36                             Admit Date: 12-12-20 @ 18:29      Trauma Activation:  Admit GCS: E-     V-     M-     HPI:  Pt is a is 65 y/o F  brought in by EMS after unwitnessed fall in her bedroom +ETOH, +LOC, does not recall events. Patient comes in GCS 15, ABC'S intact, A&OX3, c-collar in place moving all extremities. Patient has no complaints of pain, only visible injury small 2 cm laceration to right forehead into scalp. Patient denies cp, sob, sick contacts, recent travel. (12 Dec 2020 18:48)      PAST MEDICAL & SURGICAL HISTORY:  No pertinent past medical history    No significant past surgical history      [  ] No significant past history as reviewed with the patient and family    Medications (inpatient):   Medications (PRN):  Allergies: Allergy Status Unknown  (Intolerances: )    Vital Signs Last 24 Hrs  T(C): --  T(F): --  HR: --  BP: --  BP(mean): --  RR: --  SpO2: --    Physical Exam:    Neuro: A&OX3, GCS 15     HEENT: scalp laceration repaired with staples (right side of head)     Pulm: no respiratory distress, no conversational dyspnea,     Cardiac:    GI:    Musculoskeletal:    Integumentary:    Vascular:                          14.3   6.00  )-----------( 197      ( 12 Dec 2020 18:39 )             42.5     12-12    144  |  108<H>  |  13.0  ----------------------------<  101<H>  3.5   |  20.0<L>  |  0.77    Ca    8.9      12 Dec 2020 18:39    TPro  7.0  /  Alb  4.3  /  TBili  <0.2<L>  /  DBili  x   /  AST  38<H>  /  ALT  43<H>  /  AlkPhos  97  12-12    PT/INR - ( 12 Dec 2020 18:39 )   PT: 10.2 sec;   INR: 0.87 ratio         PTT - ( 12 Dec 2020 18:39 )  PTT:34.5 sec      List Injuries Identified to Date:    List Operative and Interventional Radiological Procedures:     Consults (Date):  [  ] Neurosurgery   [  ] Orthopedics  [  ] Plastics  [  ] Urology  [  ] PM&R  [  ] Social Work    RADIOLOGICAL FINDINGS REVIEW:  CXR:    Pelvis Films:     C-Spine Films:    T/L/S Spine Films:    Extremity Films:    Head CT:    C-Spine CT:    Neck CT:    Chest CT:    ABD/Pelvis CT:    Other:    Interpretation of Findings:

## 2020-12-12 NOTE — H&P ADULT - NSHPPHYSICALEXAM_GEN_ALL_CORE
Constitutional: Well-developed well nourished Female in no acute distress  HEENT: Head is normocephalic and atraumatic, maxillofacial structures stable, no blood or discharge from nares or oral cavity, no cooney sign / racoon eyes, EOMI b/l, pupils [3 ]mm round and reactive to light b/l, no active drainage or redness, has small 2-3cm laceration to right forehead going to scalp, hemostasis achieved   Neck: cervical collar in place, trachea midline  Respiratory: Breath sounds CTA b/l respirations are unlabored, no accessory muscle use, no conversational dyspnea  Cardiovascular: Regular rate & rhythm, +S1, S1, Chest wall is non-tender to palpation, no subQ emphysema or crepitus palpated  Gastrointestinal: Abdomen soft, non-tender, non-distended, no rebound tenderness / guarding, no ecchymosis or external signs of abdominal trauma  Musculoskeletal: moving all extremities spontaneously, 55 motor strength of b/l Upper and lower extremities. no point tenderness or deformity noted to upper or lower extremities b/l  Pelvis: stable  Vascular: 2+ radial, femoral, and DP pulses b/l  Neurological: GCS: 15 (4/5/6). A&O x 3; no gross sensory / motor / coordination deficits  Neurospinal: no C/T/LS spine tenderness to palpation, no step-offs or signs of external trauma to the back

## 2020-12-12 NOTE — ED PROVIDER NOTE - NS ED ROS FT
CONSTITUTIONAL: No fevers, no chills  Eyes: No vision changes  Cardiovascular: No Chest pain  Respiratory: No SOB  Gastrointestinal: No n/v/c/d, +abd pain  Genitourinary: no dysuria, no hematuria  SKIN: no rashes.  MSK: no weakness, no myalgias, no arthralgias  NEURO: no headache, no weakness, no numbness  PSYCHIATRIC: no SI/HI

## 2020-12-12 NOTE — H&P ADULT - PROBLEM SELECTOR PLAN 1
all scans negative  head laceration repaired by all scans negative  head laceration repaired by ED Attending  tertiary exam negative  c-collar cleared   patient cleared by trauma to be discharged home from ed

## 2020-12-18 ENCOUNTER — TRANSCRIPTION ENCOUNTER (OUTPATIENT)
Age: 60
End: 2020-12-18

## 2021-01-11 ENCOUNTER — LABORATORY RESULT (OUTPATIENT)
Age: 61
End: 2021-01-11

## 2021-01-11 ENCOUNTER — NON-APPOINTMENT (OUTPATIENT)
Age: 61
End: 2021-01-11

## 2021-01-11 ENCOUNTER — APPOINTMENT (OUTPATIENT)
Dept: FAMILY MEDICINE | Facility: CLINIC | Age: 61
End: 2021-01-11
Payer: COMMERCIAL

## 2021-01-11 VITALS
RESPIRATION RATE: 95 BRPM | WEIGHT: 152 LBS | BODY MASS INDEX: 23.04 KG/M2 | HEART RATE: 105 BPM | TEMPERATURE: 97.3 F | HEIGHT: 68 IN

## 2021-01-11 VITALS — DIASTOLIC BLOOD PRESSURE: 70 MMHG | SYSTOLIC BLOOD PRESSURE: 110 MMHG | HEART RATE: 82 BPM

## 2021-01-11 PROCEDURE — 99072 ADDL SUPL MATRL&STAF TM PHE: CPT

## 2021-01-11 PROCEDURE — 99213 OFFICE O/P EST LOW 20 MIN: CPT

## 2021-01-11 NOTE — HISTORY OF PRESENT ILLNESS
[FreeTextEntry1] : pt. here for result and to talk about her medication [de-identified] : pt went to [facility] detox

## 2021-01-12 LAB — TSH SERPL-ACNC: 0.06 UIU/ML

## 2021-01-12 RX ORDER — LEVOTHYROXINE SODIUM 0.15 MG/1
150 TABLET ORAL DAILY
Qty: 90 | Refills: 3 | Status: DISCONTINUED | COMMUNITY
Start: 2018-09-24 | End: 2021-01-12

## 2021-01-12 RX ORDER — LEVOTHYROXINE SODIUM 0.17 MG/1
175 TABLET ORAL
Qty: 90 | Refills: 3 | Status: DISCONTINUED | COMMUNITY
Start: 2021-01-11 | End: 2021-01-12

## 2021-01-12 RX ORDER — LEVOTHYROXINE SODIUM 0.2 MG/1
200 TABLET ORAL
Qty: 30 | Refills: 3 | Status: DISCONTINUED | COMMUNITY
Start: 2020-11-04 | End: 2021-01-12

## 2021-02-09 ENCOUNTER — TRANSCRIPTION ENCOUNTER (OUTPATIENT)
Age: 61
End: 2021-02-09

## 2021-02-25 ENCOUNTER — TRANSCRIPTION ENCOUNTER (OUTPATIENT)
Age: 61
End: 2021-02-25

## 2021-03-20 NOTE — H&P ADULT - TRAUMA TEAM ALERT/CONSULT
Level Two Trauma Activation 3/20/2021 0754  Last data filed at 3/19/2021 2005  Gross per 24 hour   Intake    Output 200 ml   Net -200 ml       Physical Exam Performed:    BP (!) 148/84   Pulse 94   Temp 98 °F (36.7 °C) (Oral)   Resp 18   Ht 5' 9\" (1.753 m)   Wt 143 lb 8.3 oz (65.1 kg)   SpO2 96%   BMI 21.19 kg/m²     General appearance: Alert, sitting in a chair  HEENT: Pupils equal, round, and reactive to light. Conjunctivae/corneas clear. Neck: Supple, with full range of motion. No jugular venous distention. Trachea midline. Respiratory:  Normal respiratory effort. Clear to auscultation  Cardiovascular: Regular rate and rhythm with normal S1/S2  Abdomen: Ostomy in place, Soft, non-tender, non-distended with normal bowel sounds. Musculoskeletal: No clubbing, cyanosis or edema bilaterally. Skin: Skin color, texture, turgor normal.  No rashes or lesions. Neurologic: More alert today but remains confused, oriented to self  Psychiatric: Alert  Capillary Refill: Brisk,< 3 seconds   Peripheral Pulses: +2 palpable, equal bilaterally       Labs:   Recent Labs     03/18/21  0530 03/19/21  0721 03/20/21  0500   WBC 7.0 7.2 6.5   HGB 9.2* 9.7* 10.2*   HCT 28.5* 30.3* 31.0*    299 308     Recent Labs     03/18/21  0530 03/19/21  0721 03/20/21  0500   * 153* 154*   K 4.3 5.0 4.0   * 120* 122*   CO2 22 21 20*   BUN 41* 40* 45*   CREATININE 2.5* 2.5* 2.5*   CALCIUM 8.8 8.9 8.8   PHOS 3.6 3.7 3.8     No results for input(s): AST, ALT, BILIDIR, BILITOT, ALKPHOS in the last 72 hours. No results for input(s): INR in the last 72 hours. No results for input(s): Yepez Javed in the last 72 hours. Urinalysis:      Lab Results   Component Value Date    NITRU Negative 03/12/2021    WBCUA 8 03/12/2021    BACTERIA 4+ 03/12/2021    RBCUA 0-2 03/12/2021    BLOODU MODERATE 03/12/2021    SPECGRAV 1.015 03/12/2021    GLUCOSEU Negative 03/12/2021       Radiology:  XR CHEST PORTABLE   Final Result   Enteric tube as described. XR CHEST PORTABLE   Final Result   Enteric feeding tube projects to the proximal stomach. Other supportive   tubing is in normal position. No acute cardiopulmonary disease. CT Head WO Contrast   Final Result   No acute intracranial abnormality. CT CHEST ABDOMEN PELVIS WO CONTRAST   Final Result   Chest-      Supportive tubing is in normal position. Mild amount of secretions are noted in the right bronchus intermedius. Foci of airspace disease in the right upper and dependent both lower lobes. Pneumonia versus aspiration pneumonitis. ---      Abdomen pelvis-      Findings suggestive of enteritis. Previous bilateral hydroureteronephrosis has resolved. There is a right   lower quadrant ileal conduit with parastomal fluid, herniated portion of the   ileal conduit versus seroma. Suspected gallbladder sludge. Stable sclerotic foci along the right sacroiliac joint and left superior   medial pubis. XR CHEST PORTABLE   Final Result   No acute findings. Assessment/Plan:      Nutrition - Pt not eating or drinking enough to maintain nutritional needs  - Palliative Care discussing with his family. Considering Hospice care    Acute metabolic encephalopathy - the acute aspect has resolved  At baseline. Oriented to self only  Continue low-dose Zyprexa for agitation   Continues off restraints  Continue to monitor    Acute respiratory failure with hypoxia   Resolved. On room air  Extubated on March 14 MRSA pneumonia    Acute kidney injury on CKD  Creatinine stable at 2.5  Continue to trend  Presented at 5.8, down to 2.5 with IV fluids  Currently at/near baseline    Hypernatremia - Sodium high.  Nephrology managing    Sepsis - Resolved  Improved with IV antibiotics and IV fluids  Respiratory culture positive for MRSA    Aspiration pneumonia - resolved  S/p Unasyn and Vancomycin x 7 days total  Speech-language pathology assessing  Family does not want PEG Small bowel obstruction status post partial small bowel resection  Monitor    History of bladder cancer status post ileal conduit  Monitor        DVT Prophylaxis: Heparin  Diet: DIET DYSPHAGIA PUREED; Dysphagia Pureed; Moderately Thick (Honey)  Dietary Nutrition Supplements: Frozen Oral Supplement  Code Status: Full Code    PT/OT Eval Status: 3-5 sessions per week    Dispo - Will need SNF at discharge.  Palliative care consulting    Nuris Morris MD

## 2021-04-28 ENCOUNTER — TRANSCRIPTION ENCOUNTER (OUTPATIENT)
Age: 61
End: 2021-04-28

## 2021-04-30 ENCOUNTER — APPOINTMENT (OUTPATIENT)
Dept: FAMILY MEDICINE | Facility: CLINIC | Age: 61
End: 2021-04-30
Payer: MEDICARE

## 2021-04-30 VITALS — TEMPERATURE: 97.6 F | OXYGEN SATURATION: 97 % | HEART RATE: 86 BPM

## 2021-04-30 DIAGNOSIS — H10.9 UNSPECIFIED CONJUNCTIVITIS: ICD-10-CM

## 2021-04-30 PROCEDURE — 99213 OFFICE O/P EST LOW 20 MIN: CPT

## 2021-04-30 PROCEDURE — 99072 ADDL SUPL MATRL&STAF TM PHE: CPT

## 2021-04-30 RX ORDER — MULTIVIT-MIN/FOLIC/VIT K/LYCOP 400-300MCG
50 MCG TABLET ORAL
Refills: 0 | Status: DISCONTINUED | COMMUNITY
End: 2021-04-30

## 2021-04-30 RX ORDER — ALBUTEROL SULFATE 90 UG/1
108 (90 BASE) AEROSOL, METERED RESPIRATORY (INHALATION)
Qty: 18 | Refills: 0 | Status: DISCONTINUED | COMMUNITY
Start: 2017-11-24 | End: 2021-04-30

## 2021-04-30 RX ORDER — CHLORHEXIDINE GLUCONATE 4 %
1000 LIQUID (ML) TOPICAL
Refills: 0 | Status: DISCONTINUED | COMMUNITY
End: 2021-04-30

## 2021-04-30 NOTE — HISTORY OF PRESENT ILLNESS
[___ Days ago] : [unfilled] days ago [Congestion] : congestion [Cough] : cough [Headache] : headache [Sore Throat] : no sore throat [Wheezing] : no wheezing [Fatigue] : not fatigue [Fever] : no fever [FreeTextEntry1] : pt. went to urgent care still not feeling any better [FreeTextEntry8] : rhinitis conjestion

## 2021-05-14 ENCOUNTER — EMERGENCY (EMERGENCY)
Facility: HOSPITAL | Age: 61
LOS: 1 days | Discharge: HOME | End: 2021-05-14
Attending: EMERGENCY MEDICINE
Payer: COMMERCIAL

## 2021-05-14 VITALS
DIASTOLIC BLOOD PRESSURE: 73 MMHG | HEART RATE: 82 BPM | RESPIRATION RATE: 18 BRPM | HEIGHT: 68 IN | WEIGHT: 158.95 LBS | OXYGEN SATURATION: 93 % | SYSTOLIC BLOOD PRESSURE: 124 MMHG | TEMPERATURE: 98 F

## 2021-05-14 LAB
FLUAV AG NPH QL: SIGNIFICANT CHANGE UP COUNTS
FLUBV AG NPH QL: SIGNIFICANT CHANGE UP COUNTS
RSV RNA NPH QL NAA+NON-PROBE: SIGNIFICANT CHANGE UP COUNTS
SARS-COV-2 RNA SPEC QL NAA+PROBE: SIGNIFICANT CHANGE UP COUNTS

## 2021-05-14 PROCEDURE — 96375 TX/PRO/DX INJ NEW DRUG ADDON: CPT

## 2021-05-14 PROCEDURE — 99285 EMERGENCY DEPT VISIT HI MDM: CPT | Mod: 25

## 2021-05-14 PROCEDURE — 87637 SARSCOV2&INF A&B&RSV AMP PRB: CPT

## 2021-05-14 PROCEDURE — 71045 X-RAY EXAM CHEST 1 VIEW: CPT | Mod: 26

## 2021-05-14 PROCEDURE — 94640 AIRWAY INHALATION TREATMENT: CPT

## 2021-05-14 PROCEDURE — 96374 THER/PROPH/DIAG INJ IV PUSH: CPT

## 2021-05-14 PROCEDURE — 71045 X-RAY EXAM CHEST 1 VIEW: CPT

## 2021-05-14 PROCEDURE — 99284 EMERGENCY DEPT VISIT MOD MDM: CPT

## 2021-05-14 RX ORDER — IPRATROPIUM/ALBUTEROL SULFATE 18-103MCG
3 AEROSOL WITH ADAPTER (GRAM) INHALATION ONCE
Refills: 0 | Status: COMPLETED | OUTPATIENT
Start: 2021-05-14 | End: 2021-05-14

## 2021-05-14 RX ORDER — MAGNESIUM SULFATE 500 MG/ML
2 VIAL (ML) INJECTION ONCE
Refills: 0 | Status: COMPLETED | OUTPATIENT
Start: 2021-05-14 | End: 2021-05-14

## 2021-05-14 RX ORDER — ALBUTEROL 90 UG/1
2 AEROSOL, METERED ORAL
Qty: 1 | Refills: 0
Start: 2021-05-14 | End: 2021-06-12

## 2021-05-14 RX ORDER — ALBUTEROL 90 UG/1
2 AEROSOL, METERED ORAL ONCE
Refills: 0 | Status: COMPLETED | OUTPATIENT
Start: 2021-05-14 | End: 2021-05-14

## 2021-05-14 RX ORDER — ALBUTEROL 90 UG/1
3 AEROSOL, METERED ORAL
Qty: 360 | Refills: 0
Start: 2021-05-14 | End: 2021-06-12

## 2021-05-14 RX ADMIN — Medication 3 MILLILITER(S): at 21:42

## 2021-05-14 RX ADMIN — Medication 125 MILLIGRAM(S): at 21:42

## 2021-05-14 RX ADMIN — Medication 3 MILLILITER(S): at 21:59

## 2021-05-14 RX ADMIN — ALBUTEROL 2 PUFF(S): 90 AEROSOL, METERED ORAL at 23:19

## 2021-05-14 RX ADMIN — Medication 50 GRAM(S): at 21:42

## 2021-05-14 NOTE — ED PROVIDER NOTE - CLINICAL SUMMARY MEDICAL DECISION MAKING FREE TEXT BOX
61 y/o female with history of asthma, no hx of hospitalizations or intubations, smoker presents tot he ED c/o cough, shortness of breath, wheezing for the past 2 days worsening today. minimal expiratory wheeze throughout 61 y/o female with history of asthma, no hx of hospitalizations or intubations, smoker presents tot he ED c/o cough, shortness of breath, wheezing for the past 2 days worsening today. minimal expiratory wheeze throughout, feelign ebtter after meds, satigna t 98% on RA, Pt reassessed, pt feeling better at this time, vss, pt able to walk, talk and vocalized plan of action. Discussed in depth and explained to pt in depth the next steps that need to be taking including proper follow up with PCP or specialists. All incidental findings were discussed with pt as well. Pt verbalized their concerns and all questions were answered. Pt understands dispo and wants discharge. Given good instructions when to return to ED and importance of f/u.

## 2021-05-14 NOTE — ED PROVIDER NOTE - OBJECTIVE STATEMENT
61 y/o female with history of asthma, no hx of hospitalizations or intubations, smoker presents tot he ED c/o cough, shortness of breath, wheezing for the past 2 days worsening today. Notes feels similar to asthma in the past. Has been using albuterol and nebulizer with minimal relief but notes ran out. Admits to chest tightness, productive cough with green sputum. no chest pain, fevers, chills nausea or vomiting, Pt fully vaccinated for covid

## 2021-05-14 NOTE — ED PROVIDER NOTE - EYES, MLM
48 year old male with hemoptysis : with multile comorbidotoes: Has been on eliquis for hx of cva and has PFO Clear bilaterally, pupils equal, round and reactive to light.

## 2021-05-14 NOTE — ED ADULT TRIAGE NOTE - CHIEF COMPLAINT QUOTE
patient with HX of asthma states that she is having trouble breathing ran of Neb treatment coughing SOB/ vaccinated

## 2021-05-14 NOTE — ED PROVIDER NOTE - PATIENT PORTAL LINK FT
You can access the FollowMyHealth Patient Portal offered by [facility] by registering at the following website: [email or web address]. By joining [employer]’s FollowMyHealth portal, you will also be able to view your health information using other applications (apps) compatible with our system.

## 2021-05-14 NOTE — ED PROVIDER NOTE - ATTENDING CONTRIBUTION TO CARE
2/15/21 I, Cristi Kaplan, have personally performed a face to face diagnostic evaluation on this patient. I have reviewed the ACP note and agree with the history, exam and plan of care, except as noted.    61 yo F hx of asthma, no prior admission or intubation, ran out of her inhaler today, p/w asthma exacerbation. Patient is a chronic smoker. On exam, diffuse b/l wheezing, mild tachypnia, no abdominal breathing, no retraction. Patient given duobneb x 2. magensiu, solumedrol with improvement.

## 2021-06-16 PROBLEM — J45.909 UNSPECIFIED ASTHMA, UNCOMPLICATED: Chronic | Status: ACTIVE | Noted: 2021-05-14

## 2021-06-23 ENCOUNTER — APPOINTMENT (OUTPATIENT)
Dept: FAMILY MEDICINE | Facility: CLINIC | Age: 61
End: 2021-06-23
Payer: COMMERCIAL

## 2021-06-23 VITALS — OXYGEN SATURATION: 96 % | HEART RATE: 96 BPM | TEMPERATURE: 97.9 F

## 2021-06-23 PROCEDURE — 99213 OFFICE O/P EST LOW 20 MIN: CPT

## 2021-06-23 PROCEDURE — 99072 ADDL SUPL MATRL&STAF TM PHE: CPT

## 2021-07-26 ENCOUNTER — TRANSCRIPTION ENCOUNTER (OUTPATIENT)
Age: 61
End: 2021-07-26

## 2021-08-25 ENCOUNTER — TRANSCRIPTION ENCOUNTER (OUTPATIENT)
Age: 61
End: 2021-08-25

## 2021-10-13 ENCOUNTER — APPOINTMENT (OUTPATIENT)
Dept: FAMILY MEDICINE | Facility: CLINIC | Age: 61
End: 2021-10-13
Payer: COMMERCIAL

## 2021-10-13 ENCOUNTER — LABORATORY RESULT (OUTPATIENT)
Age: 61
End: 2021-10-13

## 2021-10-13 VITALS
RESPIRATION RATE: 14 BRPM | TEMPERATURE: 97.5 F | HEART RATE: 95 BPM | WEIGHT: 165 LBS | BODY MASS INDEX: 25.09 KG/M2 | OXYGEN SATURATION: 96 %

## 2021-10-13 VITALS — DIASTOLIC BLOOD PRESSURE: 70 MMHG | HEART RATE: 84 BPM | SYSTOLIC BLOOD PRESSURE: 110 MMHG

## 2021-10-13 DIAGNOSIS — R06.2 WHEEZING: ICD-10-CM

## 2021-10-13 DIAGNOSIS — R05.9 COUGH, UNSPECIFIED: ICD-10-CM

## 2021-10-13 PROCEDURE — 99396 PREV VISIT EST AGE 40-64: CPT | Mod: 25

## 2021-10-13 PROCEDURE — 36415 COLL VENOUS BLD VENIPUNCTURE: CPT

## 2021-10-13 PROCEDURE — 81003 URINALYSIS AUTO W/O SCOPE: CPT | Mod: QW

## 2021-10-13 NOTE — PHYSICAL EXAM
[No Acute Distress] : no acute distress [Well Nourished] : well nourished [Well Developed] : well developed [Well-Appearing] : well-appearing [Normal Sclera/Conjunctiva] : normal sclera/conjunctiva [PERRL] : pupils equal round and reactive to light [EOMI] : extraocular movements intact [Normal Outer Ear/Nose] : the outer ears and nose were normal in appearance [Normal Oropharynx] : the oropharynx was normal [No JVD] : no jugular venous distention [No Lymphadenopathy] : no lymphadenopathy [Supple] : supple [Thyroid Normal, No Nodules] : the thyroid was normal and there were no nodules present [No Respiratory Distress] : no respiratory distress  [No Accessory Muscle Use] : no accessory muscle use [Normal Rate] : normal rate  [Regular Rhythm] : with a regular rhythm [Normal S1, S2] : normal S1 and S2 [No Murmur] : no murmur heard [No Carotid Bruits] : no carotid bruits [No Abdominal Bruit] : a ~M bruit was not heard ~T in the abdomen [No Varicosities] : no varicosities [Pedal Pulses Present] : the pedal pulses are present [No Edema] : there was no peripheral edema [No Palpable Aorta] : no palpable aorta [No Extremity Clubbing/Cyanosis] : no extremity clubbing/cyanosis [Soft] : abdomen soft [Non Tender] : non-tender [Non-distended] : non-distended [No Masses] : no abdominal mass palpated [No HSM] : no HSM [Normal Bowel Sounds] : normal bowel sounds [Normal Posterior Cervical Nodes] : no posterior cervical lymphadenopathy [Normal Anterior Cervical Nodes] : no anterior cervical lymphadenopathy [No CVA Tenderness] : no CVA  tenderness [No Spinal Tenderness] : no spinal tenderness [No Joint Swelling] : no joint swelling [Grossly Normal Strength/Tone] : grossly normal strength/tone [No Rash] : no rash [Coordination Grossly Intact] : coordination grossly intact [No Focal Deficits] : no focal deficits [Normal Gait] : normal gait [Deep Tendon Reflexes (DTR)] : deep tendon reflexes were 2+ and symmetric [Normal Affect] : the affect was normal [Normal Insight/Judgement] : insight and judgment were intact [de-identified] : slight expiratory wheeze

## 2021-10-14 LAB
BILIRUB UR QL STRIP: NORMAL
ESTIMATED AVERAGE GLUCOSE: 120 MG/DL
GLUCOSE UR-MCNC: NORMAL
HBA1C MFR BLD HPLC: 5.8 %
HCG UR QL: 0.2 EU/DL
HGB UR QL STRIP.AUTO: NORMAL
KETONES UR-MCNC: NORMAL
LEUKOCYTE ESTERASE UR QL STRIP: NORMAL
NITRITE UR QL STRIP: NORMAL
PH UR STRIP: 6
PROT UR STRIP-MCNC: NORMAL
SP GR UR STRIP: 1.01
T4 FREE SERPL-MCNC: 0.8 NG/DL
TSH SERPL-ACNC: 24.8 UIU/ML

## 2021-10-19 ENCOUNTER — TRANSCRIPTION ENCOUNTER (OUTPATIENT)
Age: 61
End: 2021-10-19

## 2021-10-19 ENCOUNTER — NON-APPOINTMENT (OUTPATIENT)
Age: 61
End: 2021-10-19

## 2021-10-19 LAB
ALBUMIN SERPL ELPH-MCNC: 4.8 G/DL
ALP BLD-CCNC: 118 U/L
ALT SERPL-CCNC: 30 U/L
ANION GAP SERPL CALC-SCNC: 18 MMOL/L
AST SERPL-CCNC: 27 U/L
BASOPHILS # BLD AUTO: 0.04 K/UL
BASOPHILS NFR BLD AUTO: 0.7 %
BILIRUB SERPL-MCNC: <0.2 MG/DL
BUN SERPL-MCNC: 19 MG/DL
CALCIUM SERPL-MCNC: 9.1 MG/DL
CHLORIDE SERPL-SCNC: 104 MMOL/L
CHOLEST SERPL-MCNC: 285 MG/DL
CO2 SERPL-SCNC: 19 MMOL/L
CREAT SERPL-MCNC: 0.96 MG/DL
EOSINOPHIL # BLD AUTO: 0.12 K/UL
EOSINOPHIL NFR BLD AUTO: 2 %
GLUCOSE SERPL-MCNC: 98 MG/DL
HCT VFR BLD CALC: 44.2 %
HDLC SERPL-MCNC: 44 MG/DL
HGB BLD-MCNC: 13.9 G/DL
IMM GRANULOCYTES NFR BLD AUTO: 0.2 %
LDLC SERPL CALC-MCNC: NORMAL MG/DL
LYMPHOCYTES # BLD AUTO: 1.88 K/UL
LYMPHOCYTES NFR BLD AUTO: 32.1 %
MAN DIFF?: NORMAL
MCHC RBC-ENTMCNC: 31.4 GM/DL
MCHC RBC-ENTMCNC: 34 PG
MCV RBC AUTO: 108.1 FL
MONOCYTES # BLD AUTO: 0.39 K/UL
MONOCYTES NFR BLD AUTO: 6.7 %
NEUTROPHILS # BLD AUTO: 3.42 K/UL
NEUTROPHILS NFR BLD AUTO: 58.3 %
NONHDLC SERPL-MCNC: 241 MG/DL
PLATELET # BLD AUTO: 240 K/UL
POTASSIUM SERPL-SCNC: 3.8 MMOL/L
PROT SERPL-MCNC: 7 G/DL
RBC # BLD: 4.09 M/UL
RBC # FLD: 12.9 %
SODIUM SERPL-SCNC: 142 MMOL/L
TRIGL SERPL-MCNC: 480 MG/DL
WBC # FLD AUTO: 5.86 K/UL

## 2021-10-27 ENCOUNTER — APPOINTMENT (OUTPATIENT)
Dept: FAMILY MEDICINE | Facility: CLINIC | Age: 61
End: 2021-10-27
Payer: COMMERCIAL

## 2021-10-27 VITALS — HEART RATE: 88 BPM | TEMPERATURE: 97.9 F | OXYGEN SATURATION: 94 %

## 2021-10-27 DIAGNOSIS — E78.2 MIXED HYPERLIPIDEMIA: ICD-10-CM

## 2021-10-27 PROCEDURE — G0008: CPT

## 2021-10-27 PROCEDURE — 99213 OFFICE O/P EST LOW 20 MIN: CPT | Mod: 25

## 2021-10-27 PROCEDURE — 90686 IIV4 VACC NO PRSV 0.5 ML IM: CPT

## 2021-10-27 NOTE — PHYSICAL EXAM
[No Acute Distress] : no acute distress [Well Nourished] : well nourished [Well Developed] : well developed [Well-Appearing] : well-appearing [Normal Sclera/Conjunctiva] : normal sclera/conjunctiva [PERRL] : pupils equal round and reactive to light [EOMI] : extraocular movements intact [Normal Outer Ear/Nose] : the outer ears and nose were normal in appearance [Normal Oropharynx] : the oropharynx was normal [No JVD] : no jugular venous distention [No Lymphadenopathy] : no lymphadenopathy [Supple] : supple [Thyroid Normal, No Nodules] : the thyroid was normal and there were no nodules present [No Respiratory Distress] : no respiratory distress  [No Accessory Muscle Use] : no accessory muscle use [Clear to Auscultation] : lungs were clear to auscultation bilaterally [Normal Rate] : normal rate  [Regular Rhythm] : with a regular rhythm [Normal S1, S2] : normal S1 and S2 [No Murmur] : no murmur heard [No Carotid Bruits] : no carotid bruits [No Abdominal Bruit] : a ~M bruit was not heard ~T in the abdomen [No Varicosities] : no varicosities [Pedal Pulses Present] : the pedal pulses are present [No Edema] : there was no peripheral edema [No Palpable Aorta] : no palpable aorta [No Extremity Clubbing/Cyanosis] : no extremity clubbing/cyanosis [Soft] : abdomen soft [Non Tender] : non-tender [Non-distended] : non-distended [No Masses] : no abdominal mass palpated [No HSM] : no HSM [Normal Bowel Sounds] : normal bowel sounds [Normal Posterior Cervical Nodes] : no posterior cervical lymphadenopathy [Normal Anterior Cervical Nodes] : no anterior cervical lymphadenopathy [No CVA Tenderness] : no CVA  tenderness [No Spinal Tenderness] : no spinal tenderness [No Joint Swelling] : no joint swelling [Grossly Normal Strength/Tone] : grossly normal strength/tone [No Rash] : no rash [Coordination Grossly Intact] : coordination grossly intact [No Focal Deficits] : no focal deficits [Normal Gait] : normal gait [Deep Tendon Reflexes (DTR)] : deep tendon reflexes were 2+ and symmetric [Normal Affect] : the affect was normal [Normal Insight/Judgement] : insight and judgment were intact [de-identified] : clear no wheeze

## 2021-11-12 ENCOUNTER — APPOINTMENT (OUTPATIENT)
Dept: FAMILY MEDICINE | Facility: CLINIC | Age: 61
End: 2021-11-12

## 2021-11-24 ENCOUNTER — APPOINTMENT (OUTPATIENT)
Dept: FAMILY MEDICINE | Facility: CLINIC | Age: 61
End: 2021-11-24
Payer: COMMERCIAL

## 2021-11-24 VITALS — OXYGEN SATURATION: 95 % | TEMPERATURE: 98.6 F | HEART RATE: 86 BPM

## 2021-11-24 PROCEDURE — 36415 COLL VENOUS BLD VENIPUNCTURE: CPT

## 2021-11-24 PROCEDURE — 99214 OFFICE O/P EST MOD 30 MIN: CPT | Mod: 25

## 2021-11-24 NOTE — HISTORY OF PRESENT ILLNESS
[FreeTextEntry1] : pt. here for thyroid check [de-identified] : discussed etoh will repeat thyroid

## 2021-11-29 LAB — TSH SERPL-ACNC: 0.45 UIU/ML

## 2021-12-03 ENCOUNTER — APPOINTMENT (OUTPATIENT)
Dept: FAMILY MEDICINE | Facility: CLINIC | Age: 61
End: 2021-12-03

## 2022-01-23 ENCOUNTER — TRANSCRIPTION ENCOUNTER (OUTPATIENT)
Age: 62
End: 2022-01-23

## 2022-02-01 ENCOUNTER — TRANSCRIPTION ENCOUNTER (OUTPATIENT)
Age: 62
End: 2022-02-01

## 2022-02-01 ENCOUNTER — INPATIENT (INPATIENT)
Facility: HOSPITAL | Age: 62
LOS: 1 days | Discharge: HOME | DRG: 113 | End: 2022-02-03
Attending: STUDENT IN AN ORGANIZED HEALTH CARE EDUCATION/TRAINING PROGRAM | Admitting: STUDENT IN AN ORGANIZED HEALTH CARE EDUCATION/TRAINING PROGRAM
Payer: COMMERCIAL

## 2022-02-01 VITALS
HEART RATE: 81 BPM | OXYGEN SATURATION: 97 % | TEMPERATURE: 98 F | RESPIRATION RATE: 18 BRPM | SYSTOLIC BLOOD PRESSURE: 86 MMHG | WEIGHT: 160.06 LBS | HEIGHT: 68 IN | DIASTOLIC BLOOD PRESSURE: 50 MMHG

## 2022-02-01 LAB
ALBUMIN SERPL ELPH-MCNC: 4.7 G/DL — SIGNIFICANT CHANGE UP (ref 3.3–5.2)
ALP SERPL-CCNC: 114 U/L — SIGNIFICANT CHANGE UP (ref 40–120)
ALT FLD-CCNC: 38 U/L — HIGH
ANION GAP SERPL CALC-SCNC: 18 MMOL/L — HIGH (ref 5–17)
AST SERPL-CCNC: 34 U/L — HIGH
BASE EXCESS BLDV CALC-SCNC: 1 MMOL/L — SIGNIFICANT CHANGE UP (ref -2–3)
BASOPHILS # BLD AUTO: 0.03 K/UL — SIGNIFICANT CHANGE UP (ref 0–0.2)
BASOPHILS NFR BLD AUTO: 0.4 % — SIGNIFICANT CHANGE UP (ref 0–2)
BILIRUB SERPL-MCNC: <0.2 MG/DL — LOW (ref 0.4–2)
BUN SERPL-MCNC: 19.2 MG/DL — SIGNIFICANT CHANGE UP (ref 8–20)
CA-I SERPL-SCNC: 1.09 MMOL/L — LOW (ref 1.15–1.33)
CALCIUM SERPL-MCNC: 9.3 MG/DL — SIGNIFICANT CHANGE UP (ref 8.6–10.2)
CHLORIDE BLDV-SCNC: 105 MMOL/L — SIGNIFICANT CHANGE UP (ref 98–107)
CHLORIDE SERPL-SCNC: 105 MMOL/L — SIGNIFICANT CHANGE UP (ref 98–107)
CO2 SERPL-SCNC: 23 MMOL/L — SIGNIFICANT CHANGE UP (ref 22–29)
CREAT SERPL-MCNC: 0.96 MG/DL — SIGNIFICANT CHANGE UP (ref 0.5–1.3)
EOSINOPHIL # BLD AUTO: 0.12 K/UL — SIGNIFICANT CHANGE UP (ref 0–0.5)
EOSINOPHIL NFR BLD AUTO: 1.7 % — SIGNIFICANT CHANGE UP (ref 0–6)
ETHANOL SERPL-MCNC: 63 MG/DL — HIGH (ref 0–9)
GAS PNL BLDV: 143 MMOL/L — SIGNIFICANT CHANGE UP (ref 136–145)
GAS PNL BLDV: SIGNIFICANT CHANGE UP
GLUCOSE BLDV-MCNC: 122 MG/DL — HIGH (ref 70–99)
GLUCOSE SERPL-MCNC: 134 MG/DL — HIGH (ref 70–99)
HCO3 BLDV-SCNC: 27 MMOL/L — SIGNIFICANT CHANGE UP (ref 22–29)
HCT VFR BLD CALC: 43.7 % — SIGNIFICANT CHANGE UP (ref 34.5–45)
HCT VFR BLDA CALC: 41 % — SIGNIFICANT CHANGE UP (ref 34–46)
HGB BLD CALC-MCNC: 13.5 G/DL — SIGNIFICANT CHANGE UP (ref 11.7–16.1)
HGB BLD-MCNC: 14.3 G/DL — SIGNIFICANT CHANGE UP (ref 11.5–15.5)
IMM GRANULOCYTES NFR BLD AUTO: 0.6 % — SIGNIFICANT CHANGE UP (ref 0–1.5)
LACTATE BLDV-MCNC: 2.5 MMOL/L — HIGH (ref 0.5–2)
LIDOCAIN IGE QN: 61 U/L — HIGH (ref 22–51)
LYMPHOCYTES # BLD AUTO: 2.58 K/UL — SIGNIFICANT CHANGE UP (ref 1–3.3)
LYMPHOCYTES # BLD AUTO: 37.3 % — SIGNIFICANT CHANGE UP (ref 13–44)
MAGNESIUM SERPL-MCNC: 2.2 MG/DL — SIGNIFICANT CHANGE UP (ref 1.6–2.6)
MCHC RBC-ENTMCNC: 32.7 GM/DL — SIGNIFICANT CHANGE UP (ref 32–36)
MCHC RBC-ENTMCNC: 34 PG — SIGNIFICANT CHANGE UP (ref 27–34)
MCV RBC AUTO: 104 FL — HIGH (ref 80–100)
MONOCYTES # BLD AUTO: 0.42 K/UL — SIGNIFICANT CHANGE UP (ref 0–0.9)
MONOCYTES NFR BLD AUTO: 6.1 % — SIGNIFICANT CHANGE UP (ref 2–14)
NEUTROPHILS # BLD AUTO: 3.73 K/UL — SIGNIFICANT CHANGE UP (ref 1.8–7.4)
NEUTROPHILS NFR BLD AUTO: 53.9 % — SIGNIFICANT CHANGE UP (ref 43–77)
NT-PROBNP SERPL-SCNC: 20 PG/ML — SIGNIFICANT CHANGE UP (ref 0–300)
PCO2 BLDV: 51 MMHG — HIGH (ref 39–42)
PH BLDV: 7.33 — SIGNIFICANT CHANGE UP (ref 7.32–7.43)
PLATELET # BLD AUTO: 276 K/UL — SIGNIFICANT CHANGE UP (ref 150–400)
PO2 BLDV: 49 MMHG — HIGH (ref 25–45)
POTASSIUM BLDV-SCNC: 3.6 MMOL/L — SIGNIFICANT CHANGE UP (ref 3.5–5.1)
POTASSIUM SERPL-MCNC: 3.4 MMOL/L — LOW (ref 3.5–5.3)
POTASSIUM SERPL-SCNC: 3.4 MMOL/L — LOW (ref 3.5–5.3)
PROT SERPL-MCNC: 7.3 G/DL — SIGNIFICANT CHANGE UP (ref 6.6–8.7)
RBC # BLD: 4.2 M/UL — SIGNIFICANT CHANGE UP (ref 3.8–5.2)
RBC # FLD: 13 % — SIGNIFICANT CHANGE UP (ref 10.3–14.5)
SAO2 % BLDV: 77.6 % — SIGNIFICANT CHANGE UP
SODIUM SERPL-SCNC: 146 MMOL/L — HIGH (ref 135–145)
TROPONIN T SERPL-MCNC: <0.01 NG/ML — SIGNIFICANT CHANGE UP (ref 0–0.06)
WBC # BLD: 6.92 K/UL — SIGNIFICANT CHANGE UP (ref 3.8–10.5)
WBC # FLD AUTO: 6.92 K/UL — SIGNIFICANT CHANGE UP (ref 3.8–10.5)

## 2022-02-01 PROCEDURE — 71045 X-RAY EXAM CHEST 1 VIEW: CPT | Mod: 26

## 2022-02-01 PROCEDURE — 99285 EMERGENCY DEPT VISIT HI MDM: CPT

## 2022-02-01 PROCEDURE — 93010 ELECTROCARDIOGRAM REPORT: CPT

## 2022-02-01 RX ORDER — ALBUTEROL 90 UG/1
2 AEROSOL, METERED ORAL
Refills: 0 | Status: DISCONTINUED | OUTPATIENT
Start: 2022-02-01 | End: 2022-02-02

## 2022-02-01 RX ORDER — ONDANSETRON 8 MG/1
4 TABLET, FILM COATED ORAL ONCE
Refills: 0 | Status: COMPLETED | OUTPATIENT
Start: 2022-02-01 | End: 2022-02-01

## 2022-02-01 RX ORDER — SODIUM CHLORIDE 9 MG/ML
1000 INJECTION INTRAMUSCULAR; INTRAVENOUS; SUBCUTANEOUS ONCE
Refills: 0 | Status: COMPLETED | OUTPATIENT
Start: 2022-02-01 | End: 2022-02-01

## 2022-02-01 RX ADMIN — ONDANSETRON 4 MILLIGRAM(S): 8 TABLET, FILM COATED ORAL at 23:15

## 2022-02-01 RX ADMIN — ALBUTEROL 2 PUFF(S): 90 AEROSOL, METERED ORAL at 22:30

## 2022-02-01 RX ADMIN — SODIUM CHLORIDE 1000 MILLILITER(S): 9 INJECTION INTRAMUSCULAR; INTRAVENOUS; SUBCUTANEOUS at 22:30

## 2022-02-01 RX ADMIN — Medication 125 MILLIGRAM(S): at 22:30

## 2022-02-01 NOTE — ED PROVIDER NOTE - NSICDXPASTMEDICALHX_GEN_ALL_CORE_FT
PAST MEDICAL HISTORY:  Asthma       Alcohol abuse     Chronic obstructive pulmonary disease (COPD)

## 2022-02-01 NOTE — ED PROVIDER NOTE - CARE PLAN
Principal Discharge DX:	Syncope  Secondary Diagnosis:	COPD exacerbation   1 Principal Discharge DX:	Fracture of inferior orbital wall  Secondary Diagnosis:	COPD exacerbation  Secondary Diagnosis:	Syncope

## 2022-02-01 NOTE — ED ADULT TRIAGE NOTE - CHIEF COMPLAINT QUOTE
pt is A&O4 c/o dizziness and syncopized ,as per family member pt had seizures activity   MD Rasheed called at bedside

## 2022-02-01 NOTE — ED PROVIDER NOTE - PROGRESS NOTE DETAILS
Patient feeling better. Patient likely with seizure episode secondary to alcohol use as patient has history of multiple seizures during withdrawal period. Patient declining librium stating she has not drank alcohol contrary to labs. Will continue to observe.  BP is as noted, however, patient has a chronic history of hypotension that she is currently observing not requiring medication. Wheezing has improved significantly. Patient feeling much better. CT head and repeat labs pending. If negative patient to [facility] is as noted. Patient with pain with upward movement of the globe however, remainder of the examination is normal. Discussed case with Dr. Conklin whom reviewed the findings and request decadron 10 mg q12 hours and admission by trauma team. Trauma consult placed.

## 2022-02-01 NOTE — ED PROVIDER NOTE - CROS ED ENMT ALL NEG
negative... Glycopyrrolate Pregnancy And Lactation Text: This medication is Pregnancy Category B and is considered safe during pregnancy. It is unknown if it is excreted breast milk.

## 2022-02-01 NOTE — ED PROVIDER NOTE - CLINICAL SUMMARY MEDICAL DECISION MAKING FREE TEXT BOX
pt with episode of near syncope/syncope of unclear origin, also has active COPD exacerbation probably related to wheezing, order labs, CT bronchodilators and hydration.

## 2022-02-01 NOTE — ED PROVIDER NOTE - OBJECTIVE STATEMENT
62 y/o female with PMHx of asthma, COPD, alcohol abuse presents to the ED after syncopal/near syncopal episode. Pt stood up to get water from the fridge and had syncopal episode and per EMS pt  reported seizure like activity, did have incontinence, but states she remembers everything. Pt currently states she feels dizzy and has headache, denies hitting her head. Pt states over the past few days she has had sore throat, cough. Pt has been using Albuterol treatments at home. Pt notes she did have one prior seizure in past, pt states she has not drank in over a month, goes to AA regularly. Pt full vaccinated against covid 62 y/o female with PMHx of asthma, COPD, alcohol abuse presents to the ED after syncopal/near syncopal episode. Around 21:30 pt stood up to get water from the fridge and had syncopal episode and per EMS pt  reported seizure like activity, did have incontinence, but states she remembers everything. Pt currently states she feels dizzy and has headache, denies hitting her head. Pt states over the past few days she has had sore throat, cough. Pt has been using Albuterol treatments at home. Pt notes she did have one prior seizure in past, pt states she has not drank in over a month, goes to AA regularly. Pt full vaccinated against covid 60 y/o female with PMHx of asthma, COPD, alcohol abuse presents to the ED after syncopal/near syncopal episode. Around 21:30 pt stood up to get water from the fridge and had syncopal episode and per EMS pt  reported seizure like activity, did have incontinence, but states she remembers everything. Pt currently states she feels dizzy and has headache, denies hitting her head. Pt states over the past few days she has had sore throat, cough. Pt has been using Albuterol treatments at home. Pt notes she did have one prior seizure in past due to alcohol withdrawal, pt states she has not drank in over a month, goes to AA regularly. Pt full vaccinated against covid 60 y/o female with PMHx of asthma, COPD, alcohol abuse presents to the ED after syncopal/near syncopal episode. Around 21:30 pt stood up to get water from the fridge and had syncopal episode and per EMS pt  reported seizure like activity, did have incontinence, but states she remembers everything. Pt currently states she feels dizzy and has headache, denies hitting her head. Pt states over the past few days she has had sore throat, cough. Pt has been using Albuterol treatments at home. Pt notes she did have one prior seizure in past due to alcohol withdrawal, pt states she has not drank in over a month, goes to AA regularly. Pt full vaccinated against covid. EMS team was informed that patient has history of recurrent hypotension as per .

## 2022-02-01 NOTE — ED PROVIDER NOTE - NSFOLLOWUPINSTRUCTIONS_ED_ALL_ED_FT
1) Follow up with your doctor in 1-2 days  2) Return to the ER for worsening or concerning symptoms  3) Consider obtaining assistance regarding alcohol use.      COPD (Chronic Obstructive Pulmonary Disease)    WHAT YOU NEED TO KNOW:    COPD (chronic obstructive pulmonary disease) can get worse quickly. Your healthcare providers will help you create a care plan to use at home. The plan will give directions on how to prevent or manage shortness of breath. Your family members or anyone who cares for you will also get directions to help you.    Inspiration and Expiration         DISCHARGE INSTRUCTIONS:    Call your local emergency number (911 in the [place]) if:   •You feel lightheaded, short of breath, and have chest pain.          Return to the emergency department if:   •You cough up blood.      •You are confused, dizzy, or feel faint.      •Your arm or leg feels warm, tender, and painful. It may look swollen and red.      Call your doctor if:   •You have increased shortness of breath.      •You need more medicine than usual to control your symptoms.      •You are coughing or wheezing more than usual.      •You are coughing up more mucus, or it has a new color or odor.      •You gain more than 3 pounds in a week.      •You have a fever, a runny or stuffy nose, and a sore throat, or other cold or flu symptoms.      •Your skin, lips, or nails start to turn blue.      •You have swelling in your legs or ankles.      •You are very tired or weak for more than a day.      •You notice changes in your mood, or changes in your ability to think or concentrate.      •You have questions or concerns about your condition or care.      Medicines:   •Short-acting bronchodilators may be called rescue inhalers or relievers. They relieve sudden, severe symptoms and start to work right away.      •Long-acting bronchodilators may be called controllers. This medicine helps open the airways over time, and is used to decrease and prevent breathing problems. Long-acting bronchodilators should not be used to treat sudden, severe symptoms, such as trouble breathing.      •Antibioticsmay be given for up to 5 days to treat a bacterial infection during an exacerbation.      •Take your medicine as directed. Contact your healthcare provider if you think your medicine is not helping or if you have side effects. Tell him or her if you are allergic to any medicine. Keep a list of the medicines, vitamins, and herbs you take. Include the amounts, and when and why you take them. Bring the list or the pill bottles to follow-up visits. Carry your medicine list with you in case of an emergency.      Help make breathing easier:   •Use pursed-lip breathing any time you feel short of breath. Take a deep breath in through your nose. Slowly breathe out through your mouth with your lips pursed. Try to take 2 times as long to breathe out as to breathe in. This helps you get rid of as much air from your lungs as possible. You can also practice this breathing pattern while you bend, lift, climb stairs, or exercise. It slows down your breathing and helps move more air in and out of your lungs.  Breathe in Breathe out           •Avoid anything that makes your symptoms worse. Stay out of high altitudes and places with high humidity. Stay inside, or cover your mouth and nose with a scarf when you are outside in cold weather. Stay inside on days when air pollution or pollen counts are high. Do not use aerosol sprays such as deodorant, bug spray, and hairspray.      •Exercise as directed. Your healthcare provider may recommend at least 20 minutes of exercise each day to help increase your energy and decrease shortness of breath. Talk to your provider about the best exercise plan for you.  [employer] Walking for Exercise           Manage COPD and help prevent exacerbations: COPD is a serious condition that gets worse over time. A COPD exacerbation means your symptoms suddenly get worse. It is important to prevent exacerbations. An exacerbation can cause more lung damage. COPD cannot be cured, but you can take action to feel better and prevent exacerbations:   •Do not smoke. Nicotine and other chemicals in cigarettes and cigars can cause lung damage and make your COPD worse. Ask your healthcare provider for information if you currently smoke and need help to quit. E-cigarettes or smokeless tobacco still contain nicotine. Talk to your healthcare provider before you use these products.      •Avoid secondhand smoke. This is smoke another person exhales. Even if you have never smoked or have quit, it is important to avoid secondhand smoke. This smoke can also cause lung damage or trigger an exacerbation.      •Go to pulmonary rehabilitation (rehab) if directed. Rehab is a program run by specialists who help you learn to manage COPD. Examples include a pulmonologist (lung specialist), dietitian, or exercise therapist. The specialists will help you make a plan to avoid triggers that cause an exacerbation.      •Take your medicines as directed. Refill your medicines before you are out so that you do not miss a dose. Ask your healthcare provider if you have any questions on how to take your medicines.      •Protect yourself from germs. Germs can get into your lungs and cause an infection. An infection in your lungs can create more mucus and make it harder to breathe. An infection can also create swelling in your airway and prevent air from getting in. You can decrease your risk for infection by doing the following:        ?Wash your hands often with soap and water. Carry germ-killing gel with you. You can use the gel to clean your hands when soap and water are not available.  Handwashing           ?Do not touch your eyes, nose, or mouth unless you have washed your hands first.      ?Always cover your mouth when you cough. Cough into a tissue or your shirtsleeve so you do not spread germs from your hands.      ?Try to avoid people who have a cold or the flu. If you are sick, stay away from others as much as possible.      ?Ask about vaccines you may need. Influenza (the flu), pneumonia, and COVID-19 can become life-threatening for a person who has COPD. Get a yearly flu vaccine as soon as recommended, usually in September or October. The pneumonia vaccine may be given every 5 years, or as directed. COVID-19 vaccines are available in shots given in 1 or 2 doses. Your healthcare provider can tell you if you should also get other vaccines, and when to get them.      •Drink liquids as directed. You may need to drink more liquid than usual. Liquid will help to keep your air passages moist and help you cough up mucus. Ask how much liquid to drink each day and which liquids are best for you.      Follow up with your doctor as directed: You may need more tests. Your doctor may refer you to a specialist, depending on your needs. Some specialist services may be available through your pulmonary rehab program. Write down your questions so you remember to ask them during your visits.      Syncope in Older Adults    WHAT YOU NEED TO KNOW:    Syncope is also called fainting or passing out. Syncope is a sudden, temporary loss of consciousness, followed by a fall from a standing or sitting position. A syncope episode is usually short.    DISCHARGE INSTRUCTIONS:    Seek care immediately if:   •You are bleeding because you accidently hit your head after fainting.       •You suddenly have double vision, difficulty speaking, numbness, and cannot move your arms or legs.      •You have chest pain and trouble breathing.      •You vomit blood or material that looks like coffee grounds.      •You see blood in your bowel movement.      Contact your healthcare provider if:   •You have another fainting spell.      •You have a headache, fast heartbeat, or feel too dizzy to stand up.      •You have questions or concerns about your condition or care.      Medicines:   •Medicines may be needed to help your heart pump strongly and regularly. Your healthcare provider may also make changes to any medicines that are causing syncope.       •Take your medicine as directed. Contact your healthcare provider if you think your medicine is not helping or if you have side effects. Tell him or her if you are allergic to any medicine. Keep a list of the medicines, vitamins, and herbs you take. Include the amounts, and when and why you take them. Bring the list or the pill bottles to follow-up visits. Carry your medicine list with you in case of an emergency.      Follow up with your doctor as directed: Write down your questions so you remember to ask them during your visits.     Manage syncope:   •Keep a record of your syncope episodes. Include your symptoms and your activity before and after the episode. The record can help your healthcare provider find the cause of your syncope and help you manage episodes.      •Sit or lie down when needed. This includes when you feel dizzy, your throat is getting tight, and your vision changes. Raise your legs above the level of your heart. Your healthcare provider may also recommend that you keep the head of your bed elevated. This can help keep your blood pressure from dropping too low.      •Check your blood pressure often. This is important if you take medicine to lower your blood pressure. Check your blood pressure when you are lying down and when you are standing. Ask how often to check during the day. Keep a record of your blood pressure numbers. Your healthcare provider may use the record to help plan your treatment.  How to take a Blood Pressure           •Use assistive devices as directed. Your healthcare provider may suggest that you use a cane or walker to help you keep your balance. You may need to have grab bars put in your bathroom near the toilet or in the shower.      Prevent a syncope episode:   •Move slowly and let yourself get used to one position before you move to another position. This is very important when you change from a lying or sitting position to a standing position. Take some deep breaths before you stand up from a lying position. Stand up slowly. Sudden movements may cause a fainting spell. Sit on the side of the bed or couch for a few minutes before you stand up. If you are on bedrest, try to be upright for about 2 hours each day, or as directed. Do not lock your legs if you are standing for a long period of time. Move your legs and bend your knees to keep blood flowing.      •Follow your healthcare provider's recommendations. Your provider may recommend that you drink more liquids to prevent dehydration. You may also need to have more salt to keep your blood pressure from dropping too low and causing syncope. Your provider will tell you how much liquid and sodium to have each day. He or she will also tell you how much physical activity is safe for you. This will depend on what is causing your syncope.      •Watch for signs of low blood sugar. These include hunger, nervousness, sweating, and fast or fluttery heartbeats. Talk with your healthcare provider about ways to keep your blood sugar level steady.      •Do not strain if you are constipated. You may faint if you strain to have a bowel movement. Walking is the best way to get your bowels moving. Eat foods high in fiber to make it easier to have a bowel movement. Good examples are high-fiber cereals, beans, vegetables, and whole-grain breads. Prune juice may help make bowel movements softer.      •Be careful in hot weather. Heat can cause a syncope episode. Limit activity done outside on hot days. Physical activity in hot weather can lead to dehydration. This can cause an episode.

## 2022-02-02 DIAGNOSIS — S02.30XA FRACTURE OF ORBITAL FLOOR, UNSPECIFIED SIDE, INITIAL ENCOUNTER FOR CLOSED FRACTURE: ICD-10-CM

## 2022-02-02 DIAGNOSIS — S02.31XA FRACTURE OF ORBITAL FLOOR, RIGHT SIDE, INITIAL ENCOUNTER FOR CLOSED FRACTURE: ICD-10-CM

## 2022-02-02 LAB
ANION GAP SERPL CALC-SCNC: 11 MMOL/L — SIGNIFICANT CHANGE UP (ref 5–17)
APPEARANCE UR: CLEAR — SIGNIFICANT CHANGE UP
APTT BLD: 28.8 SEC — SIGNIFICANT CHANGE UP (ref 27.5–35.5)
BILIRUB UR-MCNC: NEGATIVE — SIGNIFICANT CHANGE UP
BLD GP AB SCN SERPL QL: SIGNIFICANT CHANGE UP
BUN SERPL-MCNC: 18.7 MG/DL — SIGNIFICANT CHANGE UP (ref 8–20)
CALCIUM SERPL-MCNC: 8.1 MG/DL — LOW (ref 8.6–10.2)
CHLORIDE SERPL-SCNC: 107 MMOL/L — SIGNIFICANT CHANGE UP (ref 98–107)
CK SERPL-CCNC: 95 U/L — SIGNIFICANT CHANGE UP (ref 25–170)
CO2 SERPL-SCNC: 25 MMOL/L — SIGNIFICANT CHANGE UP (ref 22–29)
COLOR SPEC: YELLOW — SIGNIFICANT CHANGE UP
CREAT SERPL-MCNC: 0.66 MG/DL — SIGNIFICANT CHANGE UP (ref 0.5–1.3)
DIFF PNL FLD: NEGATIVE — SIGNIFICANT CHANGE UP
GLUCOSE SERPL-MCNC: 131 MG/DL — HIGH (ref 70–99)
GLUCOSE UR QL: NEGATIVE MG/DL — SIGNIFICANT CHANGE UP
INR BLD: 0.98 RATIO — SIGNIFICANT CHANGE UP (ref 0.88–1.16)
KETONES UR-MCNC: NEGATIVE — SIGNIFICANT CHANGE UP
LACTATE BLDV-MCNC: 1.7 MMOL/L — SIGNIFICANT CHANGE UP (ref 0.5–2)
LEUKOCYTE ESTERASE UR-ACNC: NEGATIVE — SIGNIFICANT CHANGE UP
NITRITE UR-MCNC: NEGATIVE — SIGNIFICANT CHANGE UP
PH UR: 6 — SIGNIFICANT CHANGE UP (ref 5–8)
POTASSIUM SERPL-MCNC: 3.9 MMOL/L — SIGNIFICANT CHANGE UP (ref 3.5–5.3)
POTASSIUM SERPL-SCNC: 3.9 MMOL/L — SIGNIFICANT CHANGE UP (ref 3.5–5.3)
PROT UR-MCNC: NEGATIVE — SIGNIFICANT CHANGE UP
PROTHROM AB SERPL-ACNC: 11.4 SEC — SIGNIFICANT CHANGE UP (ref 10.6–13.6)
RAPID RVP RESULT: SIGNIFICANT CHANGE UP
SARS-COV-2 RNA SPEC QL NAA+PROBE: SIGNIFICANT CHANGE UP
SODIUM SERPL-SCNC: 143 MMOL/L — SIGNIFICANT CHANGE UP (ref 135–145)
SP GR SPEC: 1.02 — SIGNIFICANT CHANGE UP (ref 1.01–1.02)
TROPONIN T SERPL-MCNC: <0.01 NG/ML — SIGNIFICANT CHANGE UP (ref 0–0.06)
UROBILINOGEN FLD QL: NEGATIVE MG/DL — SIGNIFICANT CHANGE UP

## 2022-02-02 PROCEDURE — 99232 SBSQ HOSP IP/OBS MODERATE 35: CPT

## 2022-02-02 PROCEDURE — 71045 X-RAY EXAM CHEST 1 VIEW: CPT | Mod: 26

## 2022-02-02 PROCEDURE — 70486 CT MAXILLOFACIAL W/O DYE: CPT | Mod: 26

## 2022-02-02 PROCEDURE — G1004: CPT

## 2022-02-02 PROCEDURE — 70450 CT HEAD/BRAIN W/O DYE: CPT | Mod: 26,MG,59

## 2022-02-02 PROCEDURE — 93306 TTE W/DOPPLER COMPLETE: CPT | Mod: 26

## 2022-02-02 DEVICE — IMPLANTABLE DEVICE: Type: IMPLANTABLE DEVICE | Status: FUNCTIONAL

## 2022-02-02 DEVICE — SPONGE HSTAT SURGICEL 2X14": Type: IMPLANTABLE DEVICE | Status: FUNCTIONAL

## 2022-02-02 RX ORDER — ONDANSETRON 8 MG/1
4 TABLET, FILM COATED ORAL ONCE
Refills: 0 | Status: DISCONTINUED | OUTPATIENT
Start: 2022-02-02 | End: 2022-02-02

## 2022-02-02 RX ORDER — FENTANYL CITRATE 50 UG/ML
50 INJECTION INTRAVENOUS
Refills: 0 | Status: DISCONTINUED | OUTPATIENT
Start: 2022-02-02 | End: 2022-02-02

## 2022-02-02 RX ORDER — LEVOTHYROXINE SODIUM 125 MCG
200 TABLET ORAL DAILY
Refills: 0 | Status: DISCONTINUED | OUTPATIENT
Start: 2022-02-02 | End: 2022-02-02

## 2022-02-02 RX ORDER — ALBUTEROL 90 UG/1
2 AEROSOL, METERED ORAL EVERY 6 HOURS
Refills: 0 | Status: DISCONTINUED | OUTPATIENT
Start: 2022-02-02 | End: 2022-02-03

## 2022-02-02 RX ORDER — SODIUM CHLORIDE 9 MG/ML
1000 INJECTION, SOLUTION INTRAVENOUS
Refills: 0 | Status: DISCONTINUED | OUTPATIENT
Start: 2022-02-02 | End: 2022-02-02

## 2022-02-02 RX ORDER — SERTRALINE 25 MG/1
50 TABLET, FILM COATED ORAL DAILY
Refills: 0 | Status: DISCONTINUED | OUTPATIENT
Start: 2022-02-02 | End: 2022-02-03

## 2022-02-02 RX ORDER — DEXAMETHASONE 0.5 MG/5ML
10 ELIXIR ORAL ONCE
Refills: 0 | Status: COMPLETED | OUTPATIENT
Start: 2022-02-02 | End: 2022-02-02

## 2022-02-02 RX ORDER — LEVOTHYROXINE SODIUM 125 MCG
1 TABLET ORAL
Qty: 0 | Refills: 0 | DISCHARGE

## 2022-02-02 RX ORDER — SERTRALINE 25 MG/1
50 TABLET, FILM COATED ORAL DAILY
Refills: 0 | Status: DISCONTINUED | OUTPATIENT
Start: 2022-02-02 | End: 2022-02-02

## 2022-02-02 RX ORDER — DEXAMETHASONE 0.5 MG/5ML
10 ELIXIR ORAL
Refills: 0 | Status: DISCONTINUED | OUTPATIENT
Start: 2022-02-02 | End: 2022-02-02

## 2022-02-02 RX ORDER — DEXAMETHASONE 0.5 MG/5ML
10 ELIXIR ORAL EVERY 12 HOURS
Refills: 0 | Status: DISCONTINUED | OUTPATIENT
Start: 2022-02-02 | End: 2022-02-02

## 2022-02-02 RX ORDER — LEVOTHYROXINE SODIUM 125 MCG
200 TABLET ORAL DAILY
Refills: 0 | Status: DISCONTINUED | OUTPATIENT
Start: 2022-02-02 | End: 2022-02-03

## 2022-02-02 RX ORDER — SODIUM CHLORIDE 9 MG/ML
1000 INJECTION INTRAMUSCULAR; INTRAVENOUS; SUBCUTANEOUS
Refills: 0 | Status: DISCONTINUED | OUTPATIENT
Start: 2022-02-02 | End: 2022-02-02

## 2022-02-02 RX ORDER — SODIUM CHLORIDE 9 MG/ML
1000 INJECTION INTRAMUSCULAR; INTRAVENOUS; SUBCUTANEOUS ONCE
Refills: 0 | Status: COMPLETED | OUTPATIENT
Start: 2022-02-02 | End: 2022-02-02

## 2022-02-02 RX ORDER — ENOXAPARIN SODIUM 100 MG/ML
40 INJECTION SUBCUTANEOUS DAILY
Refills: 0 | Status: DISCONTINUED | OUTPATIENT
Start: 2022-02-02 | End: 2022-02-02

## 2022-02-02 RX ORDER — ENOXAPARIN SODIUM 100 MG/ML
40 INJECTION SUBCUTANEOUS DAILY
Refills: 0 | Status: DISCONTINUED | OUTPATIENT
Start: 2022-02-02 | End: 2022-02-03

## 2022-02-02 RX ORDER — DEXAMETHASONE 0.5 MG/5ML
10 ELIXIR ORAL ONCE
Refills: 0 | Status: DISCONTINUED | OUTPATIENT
Start: 2022-02-02 | End: 2022-02-02

## 2022-02-02 RX ORDER — SERTRALINE 25 MG/1
1 TABLET, FILM COATED ORAL
Qty: 0 | Refills: 0 | DISCHARGE

## 2022-02-02 RX ORDER — BACITRACIN 500 [USP'U]/G
1 OINTMENT OPHTHALMIC
Refills: 0 | Status: DISCONTINUED | OUTPATIENT
Start: 2022-02-02 | End: 2022-02-03

## 2022-02-02 RX ADMIN — Medication 24 MILLIGRAM(S): at 23:32

## 2022-02-02 RX ADMIN — FENTANYL CITRATE 50 MICROGRAM(S): 50 INJECTION INTRAVENOUS at 20:43

## 2022-02-02 RX ADMIN — Medication 102 MILLIGRAM(S): at 07:18

## 2022-02-02 RX ADMIN — SODIUM CHLORIDE 75 MILLILITER(S): 9 INJECTION, SOLUTION INTRAVENOUS at 22:22

## 2022-02-02 RX ADMIN — FENTANYL CITRATE 50 MICROGRAM(S): 50 INJECTION INTRAVENOUS at 20:31

## 2022-02-02 RX ADMIN — SODIUM CHLORIDE 75 MILLILITER(S): 9 INJECTION INTRAMUSCULAR; INTRAVENOUS; SUBCUTANEOUS at 09:33

## 2022-02-02 RX ADMIN — SODIUM CHLORIDE 1000 MILLILITER(S): 9 INJECTION INTRAMUSCULAR; INTRAVENOUS; SUBCUTANEOUS at 01:05

## 2022-02-02 RX ADMIN — SODIUM CHLORIDE 1000 MILLILITER(S): 9 INJECTION INTRAMUSCULAR; INTRAVENOUS; SUBCUTANEOUS at 00:32

## 2022-02-02 RX ADMIN — ALBUTEROL 2 PUFF(S): 90 AEROSOL, METERED ORAL at 07:18

## 2022-02-02 NOTE — ED ADULT NURSE NOTE - OBJECTIVE STATEMENT
Assumed care of the Pt AOx3 in no acute distress. Pt complaining syncopal episode denies any LOC or hitting head. Pt complaining of headache, nausea, dizziness and vomiting. Pt Denied any drug or alc use. Pt placed on CM and continuous pulse breathing even and unlabored pt educated on plan of care, pt able to successfully teach back plan of care to RN, RN will continue to reeducate pt during hospital stay.

## 2022-02-02 NOTE — H&P ADULT - HISTORY OF PRESENT ILLNESS
ACUTE CARE SURGERY CONSULT    HPI:  62 yo female with a PMH of asthma who is presenting to the ED s/p fall. She states that last night around 9:00 PM, she was getting a drink from the refrigerator when she fell and hit the right side of her face. She denies any headstrike or loss of consciousness, and has full recollection of the events. She currently endorses pain in the right side of her face, near her right eye. She endorses a headache, but states that the headache was present since before the fall, and she has been having symptoms of cough and congestion for the past few days.     In the ED, imaging was notable for a right orbital floor fracture with partial entrapment of the right inferior rectus muscle and extraconal fat.     PAST MEDICAL HISTORY:  No pertinent past medical history    Asthma    Chronic obstructive pulmonary disease (COPD)    Alcohol abuse        PAST SURGICAL HISTORY:  No significant past surgical history        ALLERGIES:  No Known Allergies      FAMILY HISTORY: Noncontributory    SOCIAL HISTORY: Denies tobacco, EtOH, illicit substance use.     HOME MEDICATIONS:    MEDICATIONS  (STANDING):  dexAMETHasone  IVPB 10 milliGRAM(s) IV Intermittent every 12 hours  enoxaparin Injectable 40 milliGRAM(s) SubCutaneous daily  sodium chloride 0.9%. 1000 milliLiter(s) (75 mL/Hr) IV Continuous <Continuous>    MEDICATIONS  (PRN):  ALBUTerol    90 MICROgram(s) HFA Inhaler 2 Puff(s) Inhalation every 2 hours PRN Shortness of Breath and/or Wheezing      VITALS & I/Os:  Vital Signs Last 24 Hrs  T(C): 37 (02 Feb 2022 10:03), Max: 37 (02 Feb 2022 10:03)  T(F): 98.6 (02 Feb 2022 10:03), Max: 98.6 (02 Feb 2022 10:03)  HR: 76 (02 Feb 2022 10:03) (72 - 91)  BP: 130/79 (02 Feb 2022 10:03) (86/50 - 130/79)  BP(mean): --  RR: 18 (02 Feb 2022 10:03) (18 - 18)  SpO2: 93% (02 Feb 2022 10:03) (93% - 98%)  CAPILLARY BLOOD GLUCOSE          I&O's Summary      GENERAL: Alert, well developed, in no acute distress.  MENTAL STATUS: AAOx3. Appropriate affect.  HEENT: PERRLA. Extra ocular movements intact, but patient has pain in right eye with lateral movement of pupil and upward gaze.   RESPIRATORY: CTAB. No wheezing, rales or rhonchi.  CARDIOVASCULAR: RRR. No audible murmurs, rubs or gallops.   GASTROINTESTINAL: Abdomen soft, NT, ND, -R/-G.    NEUROLOGIC: Cranial nerves II-XII grossly intact. No focal neurological deficits. Moves all extremities spontaneously. Sensation intact bilaterally.  INTEGUMENTARY: No overt rashes or lesions, petechia or purpura. Good turgor. No edema.  MUSCULOSKELETAL: No cyanosis or clubbing. No gross deformities.   LYMPHATIC: Palpation of neck reveals no swelling or tenderness of neck nodes. Palpation of groin reveals no swelling or tenderness of groin nodes.    LABS:                        14.3   6.92  )-----------( 276      ( 01 Feb 2022 22:39 )             43.7     02-02    143  |  107  |  18.7  ----------------------------<  131<H>  3.9   |  25.0  |  0.66    Ca    8.1<L>      02 Feb 2022 02:00  Mg     2.2     02-01    TPro  7.3  /  Alb  4.7  /  TBili  <0.2<L>  /  DBili  x   /  AST  34<H>  /  ALT  38<H>  /  AlkPhos  114  02-01    Lactate: ALBUTerol    90 MICROgram(s) HFA Inhaler 2 Puff(s) Inhalation every 2 hours PRN  dexAMETHasone  IVPB 10 milliGRAM(s) IV Intermittent every 12 hours  enoxaparin Injectable 40 milliGRAM(s) SubCutaneous daily  sodium chloride 0.9%. 1000 milliLiter(s) IV Continuous <Continuous>     PT/INR - ( 02 Feb 2022 06:46 )   PT: 11.4 sec;   INR: 0.98 ratio         PTT - ( 02 Feb 2022 06:46 )  PTT:28.8 sec    CARDIAC MARKERS ( 02 Feb 2022 02:00 )  x     / <0.01 ng/mL / 95 U/L / x     / x      CARDIAC MARKERS ( 01 Feb 2022 22:39 )  x     / <0.01 ng/mL / x     / x     / x          02-02 @ 02:30  1.70  02-01 @ 23:20  2.50        IMAGING:      ACC: [id number] EXAM:  CT BRAIN                          PROCEDURE DATE:  02/02/2022          INTERPRETATION:  CLINICAL INDICATION: Syncope.    TECHNIQUE: CT axial images of the head were obtained without intravenous   contrast. Computer-reconstructed coronal and sagittal images were   obtained.    COMPARISON: 12/12/2020.    FINDINGS: There is no acute intracranial hemorrhage, large cortical   infarct, mass effect or midline shift. Nonspecific mild periventricular   and subcortical white matter lucencies likely represent chronic   microvascular ischemic changes. Cortical sulci and ventricles are stable.    There is no depressed skull fracture. Slight prominence of the right   lateral frontal scalp soft tissue. There is sinus mucosal thickening. The   tympanomastoid region is unremarkable. Acute, inferiorly displaced (  0.7 cm) fracture of the right orbital floor with partial entrapment of   the inferior fibers of the right inferior rectus muscle and inferior   extraconal fat. Minimal stranding in the right inferior extraconal soft   tissue. Hemorrhagic fluid level at the right maxillary sinus    IMPRESSION:    No acute intracranial hemorrhage, large cortical infarct or mass effect.   If clinically indicated, short-term follow-up or MRI may be obtained for   further evaluation.    Right orbital floor fracture with partial entrapment of the right   inferior rectus muscle and extraconal fat. Recommend clinical correlation   for further evaluation.    --- End of Report ---            JOSH WOLFE MD; Attending Radiologist  This document has been electronically signed. Feb 2 2022  4:38AM   ACUTE CARE SURGERY CONSULT    HPI:  62 yo female with a PMH of asthma who is presenting to the ED s/p fall. She states that last night around 9:00 PM, she was getting a drink from the refrigerator when she fell and hit the right side of her face. She reports seeing some "spots" just prior to event; believes tried to hold onto chair and hit side of face against corner of chair. Denies any loss of consciousness, and has full recollection of the events. She currently endorses pain in the right side of her face, near her right eye. She endorses a headache, but states that the headache was present since before the fall, and she has been having symptoms of cough and congestion for the past few days, which she attributes to seasonal asthma exacerbation.     In the ED, imaging was notable for a right orbital floor fracture with partial entrapment of the right inferior rectus muscle and extraconal fat.     PAST MEDICAL HISTORY:  No pertinent past medical history    Asthma    Chronic obstructive pulmonary disease (COPD)    Alcohol abuse        PAST SURGICAL HISTORY:  No significant past surgical history        ALLERGIES:  No Known Allergies      FAMILY HISTORY: Noncontributory    SOCIAL HISTORY: Denies tobacco, EtOH, illicit substance use.     HOME MEDICATIONS:    MEDICATIONS  (STANDING):  dexAMETHasone  IVPB 10 milliGRAM(s) IV Intermittent every 12 hours  enoxaparin Injectable 40 milliGRAM(s) SubCutaneous daily  sodium chloride 0.9%. 1000 milliLiter(s) (75 mL/Hr) IV Continuous <Continuous>    MEDICATIONS  (PRN):  ALBUTerol    90 MICROgram(s) HFA Inhaler 2 Puff(s) Inhalation every 2 hours PRN Shortness of Breath and/or Wheezing      VITALS & I/Os:  Vital Signs Last 24 Hrs  T(C): 37 (02 Feb 2022 10:03), Max: 37 (02 Feb 2022 10:03)  T(F): 98.6 (02 Feb 2022 10:03), Max: 98.6 (02 Feb 2022 10:03)  HR: 76 (02 Feb 2022 10:03) (72 - 91)  BP: 130/79 (02 Feb 2022 10:03) (86/50 - 130/79)  BP(mean): --  RR: 18 (02 Feb 2022 10:03) (18 - 18)  SpO2: 93% (02 Feb 2022 10:03) (93% - 98%)  CAPILLARY BLOOD GLUCOSE          I&O's Summary      GENERAL: Alert, well developed, in no acute distress.  MENTAL STATUS: AAOx3. Appropriate affect.  HEENT: PERRLA. Extra ocular movements intact, but patient has moderate pain in right eye with lateral movement of pupil and upward gaze.   RESPIRATORY: CTAB. No wheezing, rales or rhonchi.  CARDIOVASCULAR: RRR. No audible murmurs, rubs or gallops.   GASTROINTESTINAL: Abdomen soft, NT, ND, -R/-G.    NEUROLOGIC: Cranial nerves II-XII grossly intact. No focal neurological deficits. Moves all extremities spontaneously. Sensation intact bilaterally.  INTEGUMENTARY: No overt rashes or lesions, petechia or purpura. Good turgor. No edema.  MUSCULOSKELETAL: No cyanosis or clubbing. No gross deformities.   LYMPHATIC: Palpation of neck reveals no swelling or tenderness of neck nodes. Palpation of groin reveals no swelling or tenderness of groin nodes.    LABS:                        14.3   6.92  )-----------( 276      ( 01 Feb 2022 22:39 )             43.7     02-02    143  |  107  |  18.7  ----------------------------<  131<H>  3.9   |  25.0  |  0.66    Ca    8.1<L>      02 Feb 2022 02:00  Mg     2.2     02-01    TPro  7.3  /  Alb  4.7  /  TBili  <0.2<L>  /  DBili  x   /  AST  34<H>  /  ALT  38<H>  /  AlkPhos  114  02-01    Lactate: ALBUTerol    90 MICROgram(s) HFA Inhaler 2 Puff(s) Inhalation every 2 hours PRN  dexAMETHasone  IVPB 10 milliGRAM(s) IV Intermittent every 12 hours  enoxaparin Injectable 40 milliGRAM(s) SubCutaneous daily  sodium chloride 0.9%. 1000 milliLiter(s) IV Continuous <Continuous>     PT/INR - ( 02 Feb 2022 06:46 )   PT: 11.4 sec;   INR: 0.98 ratio         PTT - ( 02 Feb 2022 06:46 )  PTT:28.8 sec    CARDIAC MARKERS ( 02 Feb 2022 02:00 )  x     / <0.01 ng/mL / 95 U/L / x     / x      CARDIAC MARKERS ( 01 Feb 2022 22:39 )  x     / <0.01 ng/mL / x     / x     / x          02-02 @ 02:30  1.70  02-01 @ 23:20  2.50        IMAGING:      ACC: [id number] EXAM:  CT BRAIN                          PROCEDURE DATE:  02/02/2022          INTERPRETATION:  CLINICAL INDICATION: Syncope.    TECHNIQUE: CT axial images of the head were obtained without intravenous   contrast. Computer-reconstructed coronal and sagittal images were   obtained.    COMPARISON: 12/12/2020.    FINDINGS: There is no acute intracranial hemorrhage, large cortical   infarct, mass effect or midline shift. Nonspecific mild periventricular   and subcortical white matter lucencies likely represent chronic   microvascular ischemic changes. Cortical sulci and ventricles are stable.    There is no depressed skull fracture. Slight prominence of the right   lateral frontal scalp soft tissue. There is sinus mucosal thickening. The   tympanomastoid region is unremarkable. Acute, inferiorly displaced (  0.7 cm) fracture of the right orbital floor with partial entrapment of   the inferior fibers of the right inferior rectus muscle and inferior   extraconal fat. Minimal stranding in the right inferior extraconal soft   tissue. Hemorrhagic fluid level at the right maxillary sinus    IMPRESSION:    No acute intracranial hemorrhage, large cortical infarct or mass effect.   If clinically indicated, short-term follow-up or MRI may be obtained for   further evaluation.    Right orbital floor fracture with partial entrapment of the right   inferior rectus muscle and extraconal fat. Recommend clinical correlation   for further evaluation.    --- End of Report ---            JOSH WOLFE MD; Attending Radiologist  This document has been electronically signed. Feb 2 2022  4:38AM

## 2022-02-02 NOTE — CONSULT NOTE ADULT - ASSESSMENT
62 y/o F w/ pmhx of ETOH abuse, COPD and asthma presents s/p fall with right orbital fracture    - Keep NPO   - pre op labs  - plan for ORIF of the right orbit - timing to be determined based off clearance   - please contact for any acute changes [phone]  - surgical plan discussed w/ pt and  Filipe of whom are in agreement w/ plan; risks and benefits discussed including but not limited to concern of entraptment.  They verbalized understanding.  - cont. decadron  - HOB elevated   60 y/o F w/ pmhx of ETOH abuse, COPD and asthma presents s/p fall with right orbital fracture    - Keep NPO   - pre op labs  - plan for ORIF of the right orbit - timing to be determined based off clearance   - please contact for any acute changes [phone]  - cont. decadron  - HOB elevated

## 2022-02-02 NOTE — CONSULT NOTE ADULT - ATTENDING COMMENTS
62 y/o F presents with syncope   Patient states she was walking to the kitchen for water when she "saw spots" and briefly lost consciousness. She hit her head on the edge of a chair; was found to have R orbital floor fracture. Had a similar episode years ago and underwent cardiac workup including outpatient monitor, all of which was unremarkable.   EKG shows NSR, low voltage, no ST-T changes  TTE shows normal LVEF, trivial pericardial effusion   RCRI 0.4% risk of MACE  No further cardiac workup needed prior to planned procedure  Continue cardiac monitoring while inpatient. Would repeat MCOT as outpatient if no significant abnormality is seen.     Thank you for allowing me to participate in the care of this patient  Please contact me with any further questions 62 y/o F presents with syncope   Patient states she was walking to the kitchen for water when she "saw spots" and briefly lost consciousness. She hit her head on the edge of a chair; was found to have R orbital floor fracture. Had a similar episode years ago and underwent cardiac workup including outpatient monitor, all of which was unremarkable.   ETOH level 63   EKG shows NSR, low voltage, no ST-T changes  TTE shows normal LVEF, trivial pericardial effusion   RCRI 0.4% risk of MACE  No further cardiac workup needed prior to planned procedure  Continue cardiac monitoring while inpatient. Would repeat MCOT as outpatient if no significant abnormality is seen.     Thank you for allowing me to participate in the care of this patient  Please contact me with any further questions

## 2022-02-02 NOTE — PATIENT PROFILE ADULT - FALL HARM RISK - HARM RISK INTERVENTIONS

## 2022-02-02 NOTE — CONSULT NOTE ADULT - SUBJECTIVE AND OBJECTIVE BOX
Pt is a 60 y/o F w/ pmhx of asthma, COPD, ETOH abuse presenting s/p syncopal episode with trauma to the right eye.  Pt states she had dinner at 7 pm last night and around 9 pm when getting water from the refrigerator she synopsized and hit her right eye onto the corner of a chair.  A CT head was obtained w/o contrast that revealed a right orbital floor fracture with concern for entrapment  Plastic surgery was consulted for further evaluation of the right orbital fracture.  She admits to numbness of the right infraorbit and right upper lip.  She denies changes in vision, dizziness or lightheadedness at this time. No hx of facial fractures.  PAST MEDICAL HISTORY: Asthma  Alcohol abuse  Chronic obstructive pulmonary disease (COPD).   PAST SURGICAL HISTORY: No significant past surgical history.   FAMILY HISTORY: No pertinent family history in first degree relatives.   Tobacco Usage: · Tobacco Usage	Current every day smoker 1 PPD  Allergies  NKDA  Home Medications:  * Patient Currently Takes Medications as of 14-May-2021 22:45 documented in Structured Notes · 	albuterol 0.63 mg/3 mL (0.021%) inhalation solution: 3 milliliter(s) by nebulizer every 6 hours  · 	Ventolin HFA 90 mcg/inh inhalation aerosol: 2 puff(s) inhaled every 6 hours  · 	predniSONE 20 mg oral tablet: 2 tab(s) orally once a day   ROS negative except noted in HPI   RESULTS:  Blood Gas:   01-Feb-2022 23:20, Blood Gas Calcium, Ionized - Venous · Blood Gas Calcium, Ionized - Venous	   1.09	[1.15 - 1.33 mmol/L]   01-Feb-2022 23:20, Blood Gas Profile - Venous · pH, Venous	7.330	[7.320 - 7.430] As of 6/9/2020 Reference Ranges have been amended for: NIMCO, COHB, GLUWB, 	HCO3, KWB, METHHB, NAWB, O2HB, PCO2. · Base Excess, Venous	1.0	[-2.0 - 3.0 mmol/L] · pCO2, Venous	   51	[39 - 42 mmHg] · pO2, Venous	   49	[25 - 45 mmHg] · HCO3, Venous	27	[22 - 29 mmol/L] · Oxygen Saturation, Venous	77.6	[ %]   01-Feb-2022 23:20, Blood Gas Profile w/Lytes - Venous · Blood Gas Profile w/Lytes - Venous	Performed in Lab	   01-Feb-2022 23:20, Blood Gas Venous - Chloride · Blood Gas Venous - Chloride	105	[98 - 107 mmoL/L]   01-Feb-2022 23:20, Blood Gas Venous - Glucose · Blood Gas Venous - Glucose	   122	[70 - 99 mg/dL]   01-Feb-2022 23:20, Blood Gas Venous - Hemoglobin/Hematocrit · Total Hemoglobin, Calculated	13.5	[11.7 - 16.1 g/dL] · Hematocrit, Calculated	41	[34 - 46 %]   01-Feb-2022 23:20, Blood Gas Venous - Lactate · Blood Gas Venous - Lactate	   2.50	[0.50 - 2.00 mmol/L] Elevated lactate. Consider ordering follow-up lactate to trend. 	TYPE:(C=Critical, N=Notification, A=Abnormal) n 	TESTS: lactatewbv 	DATE/TIME CALLED: 02/01/2022 23:26:20 EST 	CALLED TO: daphne ansari, ed [facility] nurse 	READ BACK (2 Patient Identifiers)(Y/N): y 	READ BACK VALUES (Y/N): y 	CALLED BY: no   01-Feb-2022 23:20, Blood Gas Venous - Potassium · Blood Gas Venous - Potassium	3.6	[3.5 - 5.1 mmol/L]   01-Feb-2022 23:20, Blood Gas Venous - Sodium · Blood Gas Venous - Sodium	143	[136 - 145 mmol/L] General Chemistry:   01-Feb-2022 22:39, Comprehensive Metabolic Panel · Sodium, Serum	   146	[135 - 145 mmol/L] · Potassium, Serum	   3.4	[3.5 - 5.3 mmol/L] · Chloride, Serum	105	[98 - 107 mmol/L] · Carbon Dioxide, Serum	23.0	[22.0 - 29.0 mmol/L] · Anion Gap, Serum	   18	[5 - 17 mmol/L] · Blood Urea Nitrogen, Serum	19.2	[8.0 - 20.0 mg/dL] · Creatinine, Serum	0.96	[0.50 - 1.30 mg/dL] · Glucose, Serum	   134	[70 - 99 mg/dL] · Calcium, Total Serum	9.3	[8.6 - 10.2 mg/dL] · Protein Total, Serum	7.3	[6.6 - 8.7 g/dL] · Albumin, Serum	4.7	[3.3 - 5.2 g/dL] · Bilirubin Total, Serum	   <0.2	[0.4 - 2.0 mg/dL] · Alkaline Phosphatase, Serum	114	[40 - 120 U/L] · Aspartate Aminotransferase (AST/SGOT)	   34	[ - <=31 U/L] · Alanine Aminotransferase (ALT/SGPT)	   38	[ - <=32 U/L] · eGFR if Non African American	64	[>=60 mL/min/1.73M2] Interpretative comment 	The units for eGFR are mL/min/1.73M2 (normalized body surface area). The 	eGFR is calculated from a serum creatinine using the CKD-EPI equation. 	Other variables required for calculation are race, age and sex. Among 	patients with chronic kidney disease (CKD), the eGFR is useful in 	determining the stage of disease according to KDOQI CKD classification. 	All eGFR results are reported numerically with the following 	interpretation. 	        GFR                    With                 Without 	   (ml/min/1.73 m2)    Kidney Damage       Kidney Damage 	      >= 90                    Stage 1                     Normal 	      60-89                    Stage 2                     Decreased GFR 	      30-59     Stage 3                     Stage 3 	      15-29                    Stage 4                     Stage 4 	      < 15                      Stage 5                     Stage 5 	Each stage of CKD assumes that the associated GFR level has been in 	effect for at least 3 months. Determination of stages one and two (with 	eGFR > 59 ml/min/m2) requires estimation of kidney damage for at least 3 	months as defined by structural or functional abnormalities. 	Limitations: All estimates of GFR will be less accurate for patients at 	extremes of muscle mass (including but not limited to frail elderly, 	critically ill, or cancer patients), those with unusual diets, and those 	with conditions associated with reduced secretion or extrarenal 	elimination of creatinine. The eGFR equation is not recommended for use 	in patients with unstable creatinine levels. · eGFR if African American	74	[>=60 mL/min/1.73M2]   01-Feb-2022 22:39, Lipase, Serum · Lipase, Serum	   61	[22 - 51 U/L]   01-Feb-2022 22:39, Magnesium, Serum · Magnesium, Serum	2.2	[1.6 - 2.6 mg/dL]   01-Feb-2022 22:39, Serum Pro-Brain Natriuretic Peptide · Serum Pro-Brain Natriuretic Peptide	20	[0 - 300 pg/mL] NT-proBNP Interpretive comments: 	Acute Congestive Heart Failure is unlikely if NT-proBNP is less than 300 	pg/mL, for any age. 	Consider Acute Congestive Heart Failure if: 	AGE               NT-proBNP Result 	___               ________________ 	< 50year           > 450 pg/mL 	50 - 75 years     > 900 pg/mL 	> 75 years         > 1800 pg/ml 	All results require clinical correlation. Consider obtaining a baseline or 	"dry" NT-proBNP level when the patient is stabilized, so that subsequent 	levels can be related to that. Patients with recurrent CHF may have 	elevated 	NT-proBNP levels. Acute failure episodes generally produce levels at 	least 25 	% greater than base line levels. The above values are derived from a large 	multi-center international study, "Teodoro, BAKARI, et al, European Heart 	Journal, 	2006; 27:330-337.   01-Feb-2022 22:39, Troponin T, Serum · Troponin T, Serum	<0.01	[0.00 - 0.06 ng/mL] Reference Interval for Troponin T 	Less than 0.06 ng/mL - includes the 99th percentile of a healthy 	population at a method C.V. of 10% or less. 	NOTE: Troponin T is measured by the Roche CLIA method and these 	results are not interchangable with Troponin I results since they are 	different assays with different reference intervals. Hematology:   01-Feb-2022 22:39, Complete Blood Count + Automated Diff · WBC Count	6.92	[3.80 - 10.50 K/uL] · RBC Count	4.20	[3.80 - 5.20 M/uL] · Hemoglobin	14.3	[11.5 - 15.5 g/dL] · Hematocrit	43.7	[34.5 - 45.0 %] · Mean Cell Volume	   104.0	[80.0 - 100.0 fl] · Mean Cell Hemoglobin	34.0	[27.0 - 34.0 pg] · Mean Cell Hemoglobin Conc	32.7	[32.0 - 36.0 gm/dL] · Red Cell Distrib Width	13.0	[10.3 - 14.5 %] · Platelet Count - Automated	276	[150 - 400 K/uL] · Auto Neutrophil #	3.73	[1.80 - 7.40 K/uL] · Auto Lymphocyte #	2.58	[1.00 - 3.30 K/uL] · Auto Monocyte #	0.42	[0.00 - 0.90 K/uL] · Auto Eosinophil #	0.12	[0.00 - 0.50 K/uL] · Auto Basophil #	0.03	[0.00 - 0.20 K/uL] · Auto Neutrophil %	53.9	[43.0 - 77.0 %] Differential percentages must be correlated with absolute numbers for 	clinical significance. · Auto Lymphocyte %	37.3	[13.0 - 44.0 %] · Auto Monocyte %	6.1	[2.0 - 14.0 %] · Auto Eosinophil %	1.7	[0.0 - 6.0 %] · Auto Basophil %	0.4	[0.0 - 2.0 %] · Auto Immature Granulocyte %	0.6	[0.0 - 1.5 %] (Includes meta, myelo and promyelocytes) Therapeutic Drugs, Toxicology:   01-Feb-2022 22:39, Alcohol, Blood · Alcohol, Blood	   63	[0 - 9 mg/dL] TOXIC CONCENTRATIONS (mg/dL): 	Flushing, Slowing of  Reflexes, Impaired Visual Acuity:    	Depression of CNS: 	  > 100 	Fatalities Reported: 	     > 400 	Results reported as a "< number" are below reliably detectable limits and 	considered negative 	These ranges are intended as general guidelines. 	Alcohol metabolism can vary widely among individuals. 	This test  is approved for clinical and not for forensic purposes.   Wet Read: There are no Wet Read(s) to document.   (1) Order Name: Xray Chest 1 View-PORTABLE IMMEDIATE Order ID: [id number] Order Date/Time: 01-Feb-2022 23:09 Order Status: Performed.   Interpretation Date/Time: 02-Feb-2022 00:47 Interpreted By: Aneta Rasheed.   Interpretation: CXR negative - No pneumothorax, No opacities, No free air.  · EKG Date/Time: 01-Feb-2022 22:32 · Rate: 77 · Interpretation: normal sinus rhythm · OR: 174ms · QRS: 74ms · Other Findings: 396ms/448ms   ACC: [id number] EXAM:  CT BRAIN                        PROCEDURE DATE:  02/02/2022      INTERPRETATION:  CLINICAL INDICATION: Syncope.  TECHNIQUE: CT axial images of the head were obtained without intravenous  contrast. Computer-reconstructed coronal and sagittal images were  obtained.  COMPARISON: 12/12/2020.  FINDINGS: There is no acute intracranial hemorrhage, large cortical  infarct, mass effect or midline shift. Nonspecific mild periventricular  and subcortical white matter lucencies likely represent chronic  microvascular ischemic changes. Cortical sulci and ventricles are stable.  There is no depressed skull fracture. Slight prominence of the right  lateral frontal scalp soft tissue. There is sinus mucosal thickening. The  tympanomastoid region is unremarkable. Acute, inferiorly displaced ( 0.7 cm) fracture of the right orbital floor with partial entrapment of  the inferior fibers of the right inferior rectus muscle and inferior  extraconal fat. Minimal stranding in the right inferior extraconal soft  tissue. Hemorrhagic fluid level at the right maxillary sinus  IMPRESSION:  No acute intracranial hemorrhage, large cortical infarct or mass effect.  If clinically indicated, short-term follow-up or MRI may be obtained for  further evaluation.  Right orbital floor fracture with partial entrapment of the right  inferior rectus muscle and extraconal fat. Recommend clinical correlation  for further evaluation.  --- End of Report ---  Physical Exam General: in no acute distress, awake, alert HEENT: mild right periorbital edema, EOMI but pain with lateral movement of the right eye, no ecchymosis, PEERL, numbness in the right infraobrital nerve distribution, smile symmetrical, nares intact Chest: equal chest rise Extremities: moves all extremities

## 2022-02-02 NOTE — H&P ADULT - ATTENDING COMMENTS
Agree with exam.   Patient reports had unremarkable cardiology eval (including ?ECHO) a year ago; will however require syncope workup [cardiac, carotids] given reported dizziness/seeing spots prior to event.

## 2022-02-02 NOTE — H&P ADULT - ASSESSMENT
62 yo female with a PMH of asthma who is presenting to the ED s/p fall. She states that last night around 9:00 PM, she was getting a drink from the refrigerator when she fell and hit the right side of her face. She denies any headstrike or loss of consciousness, and has full recollection of the events. She currently endorses pain in the right side of her face, near her right eye. She endorses a headache, but states that the headache was present since before the fall, and she has been having symptoms of cough and congestion for the past few days.     In the ED, imaging was notable for a right orbital floor fracture with partial entrapment of the right inferior rectus muscle and extraconal fat.     - Admit to trauma surgery  - Anesthesia clearance  - To OR with plastic surgery for ORIF of orbit

## 2022-02-02 NOTE — PROGRESS NOTE ADULT - SUBJECTIVE AND OBJECTIVE BOX
61 F hx asthma, COPD, EtOH abuse (last drink 1 month ago) for preop assessment for OR today for Orbital floor repair.    Given patient fall 2/2 dizziness, would only get cardiology consult/TTE to rule out syncope 2/2 AS if patient has audible murmur.    If patient has no audible murmur and can do 4 METS, no cardiology consult needed.    To proceed to OR, patient requires 12 lead EKG and two T&S.       Eldon Reeves MD

## 2022-02-02 NOTE — CONSULT NOTE ADULT - ASSESSMENT
60 yo female with a PMH of asthma, current smoker (1ppd) presents to ED s/p syncope and fall. Patient states she was getting a drink of water in her kitchen when she suddenly "saw spots" and fell hitting her face on a chair. She remembers event and recovered shortly after. Currently endorses pain in the right side of her face, near her right eye. She also have been experiencing an exacerbation of her seasonal asthma recently. In the ED, imaging was notable for a right orbital floor fracture with partial entrapment of the right inferior rectus muscle and extraconal fat. Patient states this happened to her years ago and she had full cardiac workup at the time but nothing was found. She denies chest pain, back pain, headache, dizziness, SOB, HENRIQUEZ, diaphoresis, or N/V.      Cardiac Risk Stratification   60 yo female with a PMH of asthma, current smoker (1ppd) presents to ED s/p syncope and fall. Patient states she was getting a drink of water in her kitchen when she suddenly "saw spots" and fell hitting her face on a chair. She remembers event and recovered shortly after. Currently endorses pain in the right side of her face, near her right eye. She also have been experiencing an exacerbation of her seasonal asthma recently. In the ED, imaging was notable for a right orbital floor fracture with partial entrapment of the right inferior rectus muscle and extraconal fat. Patient states this happened to her years ago and she had full cardiac workup at the time but nothing was found. She denies chest pain, back pain, headache, dizziness, SOB, HENRIQUEZ, diaphoresis, or N/V.    Perioperative Risk Stratification  - RCRI 0.4% 30-day risk of death, MI, or cardiac arrest. low risk for low risk procedure  - currently in NSR  - EKG WNL  - repeat TTE   - If echo is normal, no active chest pain, evidence of ischemia, decompensated CHF, significant valvular abnormality, or unstable arrhythmia and therefore no absolute cardiac contraindication to the planned surgical procedure.    will follow  Assessment and recommendations are final when note is signed by the attending.

## 2022-02-02 NOTE — CONSULT NOTE ADULT - SUBJECTIVE AND OBJECTIVE BOX
[facility]ARDIOLOGY-[facility]                                                       [facility]                                                           Office:  [address] [place] [address]                                                          Telephone: [phone]. Fax:[phone]                                                                        CARDIOLOGY CONSULTATION NOTE                                                                                             Consult requested by:  Dr. Roman Nielson  Reason for Consultation: Cardiac Clearance for procedure  History obtained by: Patient and medical record   obtained: No  Cardiologist: none  : n/a    COVID Status: negative      Chief complaint:    Patient is a 61y old  Female who presents with a chief complaint of Right orbital floor fracture (2022 11:20)        HPI:  ACUTE CARE SURGERY CONSULT    HPI:  60 yo female with a PMH of asthma, current smoker (1ppd) presents to ED s/p syncope and fall. Patient states she was getting a drink of water in her kitchen when she suddenly "saw spots" and fell hitting her face on a chair. She remembers event and recovered shortly after. Currently endorses pain in the right side of her face, near her right eye. She also have been experiencing an exacerbation of her seasonal asthma recently. In the ED, imaging was notable for a right orbital floor fracture with partial entrapment of the right inferior rectus muscle and extraconal fat. Patient states this happened to her years ago and she had full cardiac workup at the time but nothing was found. She denies chest pain, back pain, headache, dizziness, SOB, HENRIQUEZ, diaphoresis, or N/V.      PAST MEDICAL HISTORY:  No pertinent past medical history  Asthma  Chronic obstructive pulmonary disease (COPD)  Alcohol abuse    PAST SURGICAL HISTORY:  No significant past surgical history    ALLERGIES:  No Known Allergies      FAMILY HISTORY: Noncontributory    SOCIAL HISTORY: 1PPD smoker, EtOH, illicit substance use.     HOME MEDICATIONS:    MEDICATIONS  (STANDING):  dexAMETHasone  IVPB 10 milliGRAM(s) IV Intermittent every 12 hours  enoxaparin Injectable 40 milliGRAM(s) SubCutaneous daily  sodium chloride 0.9%. 1000 milliLiter(s) (75 mL/Hr) IV Continuous <Continuous>    MEDICATIONS  (PRN):  ALBUTerol    90 MICROgram(s) HFA Inhaler 2 Puff(s) Inhalation every 2 hours PRN Shortness of Breath and/or Wheezing      VITALS & I/Os:  Vital Signs Last 24 Hrs  T(C): 37 (2022 10:03), Max: 37 (2022 10:03)  T(F): 98.6 (2022 10:03), Max: 98.6 (2022 10:03)  HR: 76 (2022 10:03) (72 - 91)  BP: 130/79 (2022 10:03) (86/50 - 130/79)  BP(mean): --  RR: 18 (2022 10:03) (18 - 18)  SpO2: 93% (2022 10:03) (93% - 98%)  CAPILLARY BLOOD GLUCOSE        I&O's Summary  GENERAL: Alert, well developed, in no acute distress.  MENTAL STATUS: AAOx3. Appropriate affect.  HEENT: PERRLA. Extra ocular movements intact, but patient has moderate pain in right eye with lateral movement of pupil and upward gaze.   RESPIRATORY: CTAB. No wheezing, rales or rhonchi.  CARDIOVASCULAR: RRR. No audible murmurs, rubs or gallops.   GASTROINTESTINAL: Abdomen soft, NT, ND, -R/-G.    NEUROLOGIC: Cranial nerves II-XII grossly intact. No focal neurological deficits. Moves all extremities spontaneously. Sensation intact bilaterally.  INTEGUMENTARY: No overt rashes or lesions, petechia or purpura. Good turgor. No edema.  MUSCULOSKELETAL: No cyanosis or clubbing. No gross deformities.   LYMPHATIC: Palpation of neck reveals no swelling or tenderness of neck nodes. Palpation of groin reveals no swelling or tenderness of groin nodes.    LABS:                        14.3   6.92  )-----------( 276      ( 2022 22:39 )             43.7         143  |  107  |  18.7  ----------------------------<  131<H>  3.9   |  25.0  |  0.66    Ca    8.1<L>      2022 02:00  Mg     2.2         TPro  7.3  /  Alb  4.7  /  TBili  <0.2<L>  /  DBili  x   /  AST  34<H>  /  ALT  38<H>  /  AlkPhos  114      Lactate: ALBUTerol    90 MICROgram(s) HFA Inhaler 2 Puff(s) Inhalation every 2 hours PRN  dexAMETHasone  IVPB 10 milliGRAM(s) IV Intermittent every 12 hours  enoxaparin Injectable 40 milliGRAM(s) SubCutaneous daily  sodium chloride 0.9%. 1000 milliLiter(s) IV Continuous <Continuous>     PT/INR - ( 2022 06:46 )   PT: 11.4 sec;   INR: 0.98 ratio         PTT - ( 2022 06:46 )  PTT:28.8 sec    CARDIAC MARKERS ( 2022 02:00 )  x     / <0.01 ng/mL / 95 U/L / x     / x      CARDIAC MARKERS ( 2022 22:39 )  x     / <0.01 ng/mL / x     / x     / x          - @ 02:30  1.70   @ 23:20  2.50        IMAGING:      ACC: [id number] EXAM:  CT BRAIN                          PROCEDURE DATE:  2022          INTERPRETATION:  CLINICAL INDICATION: Syncope.    TECHNIQUE: CT axial images of the head were obtained without intravenous   contrast. Computer-reconstructed coronal and sagittal images were   obtained.    COMPARISON: 2020.    FINDINGS: There is no acute intracranial hemorrhage, large cortical   infarct, mass effect or midline shift. Nonspecific mild periventricular   and subcortical white matter lucencies likely represent chronic   microvascular ischemic changes. Cortical sulci and ventricles are stable.    There is no depressed skull fracture. Slight prominence of the right   lateral frontal scalp soft tissue. There is sinus mucosal thickening. The   tympanomastoid region is unremarkable. Acute, inferiorly displaced (  0.7 cm) fracture of the right orbital floor with partial entrapment of   the inferior fibers of the right inferior rectus muscle and inferior   extraconal fat. Minimal stranding in the right inferior extraconal soft   tissue. Hemorrhagic fluid level at the right maxillary sinus    IMPRESSION:    No acute intracranial hemorrhage, large cortical infarct or mass effect.   If clinically indicated, short-term follow-up or MRI may be obtained for   further evaluation.    Right orbital floor fracture with partial entrapment of the right   inferior rectus muscle and extraconal fat. Recommend clinical correlation   for further evaluation.    --- End of Report ---            JOSH WOLFE MD; Attending Radiologist  This document has been electronically signed. 2022  4:38AM   (2022 11:20)        REVIEW OF SYMPTOMS:     CONSTITUTIONAL: No fever, no weight loss, no fatigue, no weight gain   ENMT:  No difficulty hearing, tinnitus, vertigo; No sinus or throat pain  NECK: No pain or stiffness  CARDIOVASCULAR: No chest pain, no dyspnea, no syncope, no palpitations, no dizziness, no Orthopnea, no Paroxsymal nocturnal dyspnea  RESPIRATORY: No Dyspnea on exertion, no Shortness of breath, no cough, no wheezing  : No dysuria, no hematuria   GI: No dark color stool, no melena, no diarrhea, no constipation, no abdominal pain   NEURO: No headache, no dizziness, no slurred speech   MUSCULOSKELETAL: No joint pain or swelling; No muscle, back, or extremity pain  PSYCH: No agitation, no anxiety.    ALL OTHER REVIEW OF SYSTEMS ARE NEGATIVE.      PREVIOUS DIAGNOSTIC TESTING  ECHO FINDINGS: PENDING ECHO  STRESS FINDINGS:  CATHETERIZATION FINDINGS:         ALLERGIES: Allergies  No Known Allergies  Intolerances    PAST MEDICAL HISTORY  No pertinent past medical history  Asthma  Chronic obstructive pulmonary disease (COPD)  Alcohol abuse    PAST SURGICAL HISTORY  No significant past surgical history    FAMILY HISTORY:  No pertinent family history in first degree relatives    SOCIAL HISTORY:  Denies alcohol/drugs  CIGARETTES: current  1 PPD smoker    CURRENT MEDICATIONS:   sertraline  dexAMETHasone  IVPB  enoxaparin Injectable  levothyroxine  sodium chloride 0.9%.        HOME MEDICATIONS:      Vital Signs Last 24 Hrs  T(C): 36.8 (2022 16:34), Max: 37 (2022 10:03)  T(F): 98.3 (2022 16:34), Max: 98.6 (2022 10:03)  HR: 71 (2022 16:34) (71 - 91)  BP: 132/83 (2022 16:34) (86/50 - 132/83)  BP(mean): --  RR: 18 (2022 16:34) (18 - 18)  SpO2: 95% (2022 16:34) (93% - 98%)      PHYSICAL EXAM:  Constitutional: Comfortable . No acute distress.   HEENT: right eye brusing, neck is supple . no JVD. No carotid bruit. PEERL   CNS: A&Ox3. No focal deficits. EOMI. Cranial nerves II-IX are intact.   Respiratory: CTAB, unlabored   Cardiovascular: S1S2 RRR. No murmur/rubs or gallop.  Gastrointestinal: Soft non-tender and non distended . +Bowel sounds. negative Corcoran's sign.  Extremities: No edema.   Psychiatric: Calm . no agitation.  Skin: No skin rash/ulcers visualized to face, hands or feet.    Intake and output:     LABS:                        14.3   6.92  )-----------( 276      ( 2022 22:39 )             43.7         143  |  107  |  18.7  ----------------------------<  131<H>  3.9   |  25.0  |  0.66    Ca    8.1<L>      2022 02:00  Mg     2.2         TPro  7.3  /  Alb  4.7  /  TBili  <0.2<L>  /  DBili  x   /  AST  34<H>  /  ALT  38<H>  /  AlkPhos  114      CARDIAC MARKERS ( 2022 02:00 )  x     / <0.01 ng/mL / 95 U/L / x     / x      CARDIAC MARKERS ( 2022 22:39 )  x     / <0.01 ng/mL / x     / x     / x        ;p-BNP=Serum Pro-Brain Natriuretic Peptide: 20 pg/mL ( @ 22:39)    PT/INR - ( 2022 06:46 )   PT: 11.4 sec;   INR: 0.98 ratio         PTT - ( 2022 06:46 )  PTT:28.8 sec  Urinalysis Basic - ( 2022 11:34 )    Color: Yellow / Appearance: Clear / S.020 / pH: x  Gluc: x / Ketone: Negative  / Bili: Negative / Urobili: Negative mg/dL   Blood: x / Protein: Negative / Nitrite: Negative   Leuk Esterase: Negative / RBC: x / WBC x   Sq Epi: x / Non Sq Epi: x / Bacteria: x        INTERPRETATION OF TELEMETRY: Reviewed by me.   ECG: NSR    RADIOLOGY & ADDITIONAL STUDIES:    X-ray:  reviewed by me.                                                         [facility]ARDIOLOG[facility]                                                       [facility] Faculty [facility]                                                           Office:  [address] [place] [address]                                                          Telephone: [phone]. Fax:[phone]                                                                        CARDIOLOGY CONSULTATION NOTE                                                                                             Consult requested by:  Dr. Roman Nielson  Reason for Consultation: Cardiac Risk Stratification for procedure  History obtained by: Patient and medical record   obtained: No  Cardiologist: none  : n/a    COVID Status: negative      Chief complaint:    Patient is a 61y old  Female who presents with a chief complaint of Right orbital floor fracture (2022 11:20)        HPI:  ACUTE CARE SURGERY CONSULT    HPI:  60 yo female with a PMH of asthma, current smoker (1ppd) presents to ED s/p syncope and fall. Patient states she was getting a drink of water in her kitchen when she suddenly "saw spots" and fell hitting her face on a chair. She remembers event and recovered shortly after. Currently endorses pain in the right side of her face, near her right eye. She also have been experiencing an exacerbation of her seasonal asthma recently. In the ED, imaging was notable for a right orbital floor fracture with partial entrapment of the right inferior rectus muscle and extraconal fat. Patient states this happened to her years ago and she had full cardiac workup at the time but nothing was found. She denies chest pain, back pain, headache, dizziness, SOB, HENRIQUEZ, diaphoresis, or N/V.      PAST MEDICAL HISTORY:  No pertinent past medical history  Asthma  Chronic obstructive pulmonary disease (COPD)  Alcohol abuse    PAST SURGICAL HISTORY:  No significant past surgical history    ALLERGIES:  No Known Allergies      FAMILY HISTORY: Noncontributory    SOCIAL HISTORY: 1PPD smoker, EtOH, illicit substance use.     HOME MEDICATIONS:    MEDICATIONS  (STANDING):  dexAMETHasone  IVPB 10 milliGRAM(s) IV Intermittent every 12 hours  enoxaparin Injectable 40 milliGRAM(s) SubCutaneous daily  sodium chloride 0.9%. 1000 milliLiter(s) (75 mL/Hr) IV Continuous <Continuous>    MEDICATIONS  (PRN):  ALBUTerol    90 MICROgram(s) HFA Inhaler 2 Puff(s) Inhalation every 2 hours PRN Shortness of Breath and/or Wheezing      VITALS & I/Os:  Vital Signs Last 24 Hrs  T(C): 37 (2022 10:03), Max: 37 (2022 10:03)  T(F): 98.6 (2022 10:03), Max: 98.6 (2022 10:03)  HR: 76 (2022 10:03) (72 - 91)  BP: 130/79 (2022 10:03) (86/50 - 130/79)  BP(mean): --  RR: 18 (2022 10:03) (18 - 18)  SpO2: 93% (2022 10:03) (93% - 98%)  CAPILLARY BLOOD GLUCOSE        I&O's Summary  GENERAL: Alert, well developed, in no acute distress.  MENTAL STATUS: AAOx3. Appropriate affect.  HEENT: PERRLA. Extra ocular movements intact, but patient has moderate pain in right eye with lateral movement of pupil and upward gaze.   RESPIRATORY: CTAB. No wheezing, rales or rhonchi.  CARDIOVASCULAR: RRR. No audible murmurs, rubs or gallops.   GASTROINTESTINAL: Abdomen soft, NT, ND, -R/-G.    NEUROLOGIC: Cranial nerves II-XII grossly intact. No focal neurological deficits. Moves all extremities spontaneously. Sensation intact bilaterally.  INTEGUMENTARY: No overt rashes or lesions, petechia or purpura. Good turgor. No edema.  MUSCULOSKELETAL: No cyanosis or clubbing. No gross deformities.   LYMPHATIC: Palpation of neck reveals no swelling or tenderness of neck nodes. Palpation of groin reveals no swelling or tenderness of groin nodes.    LABS:                        14.3   6.92  )-----------( 276      ( 2022 22:39 )             43.7         143  |  107  |  18.7  ----------------------------<  131<H>  3.9   |  25.0  |  0.66    Ca    8.1<L>      2022 02:00  Mg     2.2         TPro  7.3  /  Alb  4.7  /  TBili  <0.2<L>  /  DBili  x   /  AST  34<H>  /  ALT  38<H>  /  AlkPhos  114      Lactate: ALBUTerol    90 MICROgram(s) HFA Inhaler 2 Puff(s) Inhalation every 2 hours PRN  dexAMETHasone  IVPB 10 milliGRAM(s) IV Intermittent every 12 hours  enoxaparin Injectable 40 milliGRAM(s) SubCutaneous daily  sodium chloride 0.9%. 1000 milliLiter(s) IV Continuous <Continuous>     PT/INR - ( 2022 06:46 )   PT: 11.4 sec;   INR: 0.98 ratio         PTT - ( 2022 06:46 )  PTT:28.8 sec    CARDIAC MARKERS ( 2022 02:00 )  x     / <0.01 ng/mL / 95 U/L / x     / x      CARDIAC MARKERS ( 2022 22:39 )  x     / <0.01 ng/mL / x     / x     / x          - @ 02:30  1.70   @ 23:20  2.50        IMAGING:      ACC: [id number] EXAM:  CT BRAIN                          PROCEDURE DATE:  2022          INTERPRETATION:  CLINICAL INDICATION: Syncope.    TECHNIQUE: CT axial images of the head were obtained without intravenous   contrast. Computer-reconstructed coronal and sagittal images were   obtained.    COMPARISON: 2020.    FINDINGS: There is no acute intracranial hemorrhage, large cortical   infarct, mass effect or midline shift. Nonspecific mild periventricular   and subcortical white matter lucencies likely represent chronic   microvascular ischemic changes. Cortical sulci and ventricles are stable.    There is no depressed skull fracture. Slight prominence of the right   lateral frontal scalp soft tissue. There is sinus mucosal thickening. The   tympanomastoid region is unremarkable. Acute, inferiorly displaced (  0.7 cm) fracture of the right orbital floor with partial entrapment of   the inferior fibers of the right inferior rectus muscle and inferior   extraconal fat. Minimal stranding in the right inferior extraconal soft   tissue. Hemorrhagic fluid level at the right maxillary sinus    IMPRESSION:    No acute intracranial hemorrhage, large cortical infarct or mass effect.   If clinically indicated, short-term follow-up or MRI may be obtained for   further evaluation.    Right orbital floor fracture with partial entrapment of the right   inferior rectus muscle and extraconal fat. Recommend clinical correlation   for further evaluation.    --- End of Report ---            JOSH WOLFE MD; Attending Radiologist  This document has been electronically signed. 2022  4:38AM   (2022 11:20)        REVIEW OF SYMPTOMS:     CONSTITUTIONAL: No fever, no weight loss, no fatigue, no weight gain   ENMT:  No difficulty hearing, tinnitus, vertigo; No sinus or throat pain  NECK: No pain or stiffness  CARDIOVASCULAR: No chest pain, no dyspnea, no syncope, no palpitations, no dizziness, no Orthopnea, no Paroxsymal nocturnal dyspnea  RESPIRATORY: No Dyspnea on exertion, no Shortness of breath, no cough, no wheezing  : No dysuria, no hematuria   GI: No dark color stool, no melena, no diarrhea, no constipation, no abdominal pain   NEURO: No headache, no dizziness, no slurred speech   MUSCULOSKELETAL: No joint pain or swelling; No muscle, back, or extremity pain  PSYCH: No agitation, no anxiety.    ALL OTHER REVIEW OF SYSTEMS ARE NEGATIVE.      PREVIOUS DIAGNOSTIC TESTING  ECHO FINDINGS: PENDING ECHO  STRESS FINDINGS:  CATHETERIZATION FINDINGS:         ALLERGIES: Allergies  No Known Allergies  Intolerances    PAST MEDICAL HISTORY  No pertinent past medical history  Asthma  Chronic obstructive pulmonary disease (COPD)  Alcohol abuse    PAST SURGICAL HISTORY  No significant past surgical history    FAMILY HISTORY:  No pertinent family history in first degree relatives    SOCIAL HISTORY:  Denies alcohol/drugs  CIGARETTES: current  1 PPD smoker    CURRENT MEDICATIONS:   sertraline  dexAMETHasone  IVPB  enoxaparin Injectable  levothyroxine  sodium chloride 0.9%.        HOME MEDICATIONS:      Vital Signs Last 24 Hrs  T(C): 36.8 (2022 16:34), Max: 37 (2022 10:03)  T(F): 98.3 (2022 16:34), Max: 98.6 (2022 10:03)  HR: 71 (2022 16:34) (71 - 91)  BP: 132/83 (2022 16:34) (86/50 - 132/83)  BP(mean): --  RR: 18 (2022 16:34) (18 - 18)  SpO2: 95% (2022 16:34) (93% - 98%)      PHYSICAL EXAM:  Constitutional: Comfortable . No acute distress.   HEENT: right eye brusing, neck is supple . no JVD. No carotid bruit. PEERL   CNS: A&Ox3. No focal deficits. EOMI. Cranial nerves II-IX are intact.   Respiratory: CTAB, unlabored   Cardiovascular: S1S2 RRR. No murmur/rubs or gallop.  Gastrointestinal: Soft non-tender and non distended . +Bowel sounds. negative Corcoran's sign.  Extremities: No edema.   Psychiatric: Calm . no agitation.  Skin: No skin rash/ulcers visualized to face, hands or feet.    Intake and output:     LABS:                        14.3   6.92  )-----------( 276      ( 2022 22:39 )             43.7         143  |  107  |  18.7  ----------------------------<  131<H>  3.9   |  25.0  |  0.66    Ca    8.1<L>      2022 02:00  Mg     2.2         TPro  7.3  /  Alb  4.7  /  TBili  <0.2<L>  /  DBili  x   /  AST  34<H>  /  ALT  38<H>  /  AlkPhos  114      CARDIAC MARKERS ( 2022 02:00 )  x     / <0.01 ng/mL / 95 U/L / x     / x      CARDIAC MARKERS ( 2022 22:39 )  x     / <0.01 ng/mL / x     / x     / x        ;p-BNP=Serum Pro-Brain Natriuretic Peptide: 20 pg/mL ( @ 22:39)    PT/INR - ( 2022 06:46 )   PT: 11.4 sec;   INR: 0.98 ratio         PTT - ( 2022 06:46 )  PTT:28.8 sec  Urinalysis Basic - ( 2022 11:34 )    Color: Yellow / Appearance: Clear / S.020 / pH: x  Gluc: x / Ketone: Negative  / Bili: Negative / Urobili: Negative mg/dL   Blood: x / Protein: Negative / Nitrite: Negative   Leuk Esterase: Negative / RBC: x / WBC x   Sq Epi: x / Non Sq Epi: x / Bacteria: x        INTERPRETATION OF TELEMETRY: Reviewed by me.   ECG: NSR    RADIOLOGY & ADDITIONAL STUDIES:    X-ray:  reviewed by me.

## 2022-02-03 ENCOUNTER — NON-APPOINTMENT (OUTPATIENT)
Age: 62
End: 2022-02-03

## 2022-02-03 ENCOUNTER — TRANSCRIPTION ENCOUNTER (OUTPATIENT)
Age: 62
End: 2022-02-03

## 2022-02-03 VITALS
SYSTOLIC BLOOD PRESSURE: 112 MMHG | OXYGEN SATURATION: 95 % | TEMPERATURE: 98 F | DIASTOLIC BLOOD PRESSURE: 65 MMHG | HEART RATE: 80 BPM | RESPIRATION RATE: 18 BRPM

## 2022-02-03 LAB
HCV AB S/CO SERPL IA: 0.09 S/CO — SIGNIFICANT CHANGE UP (ref 0–0.99)
HCV AB SERPL-IMP: SIGNIFICANT CHANGE UP
TSH SERPL-MCNC: 1.33 UIU/ML — SIGNIFICANT CHANGE UP (ref 0.27–4.2)

## 2022-02-03 PROCEDURE — 82435 ASSAY OF BLOOD CHLORIDE: CPT

## 2022-02-03 PROCEDURE — 83605 ASSAY OF LACTIC ACID: CPT

## 2022-02-03 PROCEDURE — 70450 CT HEAD/BRAIN W/O DYE: CPT | Mod: MG

## 2022-02-03 PROCEDURE — 97166 OT EVAL MOD COMPLEX 45 MIN: CPT

## 2022-02-03 PROCEDURE — 82550 ASSAY OF CK (CPK): CPT

## 2022-02-03 PROCEDURE — 83735 ASSAY OF MAGNESIUM: CPT

## 2022-02-03 PROCEDURE — C1713: CPT

## 2022-02-03 PROCEDURE — 71045 X-RAY EXAM CHEST 1 VIEW: CPT

## 2022-02-03 PROCEDURE — 86900 BLOOD TYPING SEROLOGIC ABO: CPT

## 2022-02-03 PROCEDURE — 36415 COLL VENOUS BLD VENIPUNCTURE: CPT

## 2022-02-03 PROCEDURE — 97163 PT EVAL HIGH COMPLEX 45 MIN: CPT

## 2022-02-03 PROCEDURE — 81003 URINALYSIS AUTO W/O SCOPE: CPT

## 2022-02-03 PROCEDURE — 93880 EXTRACRANIAL BILAT STUDY: CPT | Mod: 26

## 2022-02-03 PROCEDURE — 85018 HEMOGLOBIN: CPT

## 2022-02-03 PROCEDURE — 70486 CT MAXILLOFACIAL W/O DYE: CPT

## 2022-02-03 PROCEDURE — 85014 HEMATOCRIT: CPT

## 2022-02-03 PROCEDURE — 82803 BLOOD GASES ANY COMBINATION: CPT

## 2022-02-03 PROCEDURE — C9399: CPT

## 2022-02-03 PROCEDURE — 86901 BLOOD TYPING SEROLOGIC RH(D): CPT

## 2022-02-03 PROCEDURE — 86803 HEPATITIS C AB TEST: CPT

## 2022-02-03 PROCEDURE — 84295 ASSAY OF SERUM SODIUM: CPT

## 2022-02-03 PROCEDURE — 99285 EMERGENCY DEPT VISIT HI MDM: CPT | Mod: 25

## 2022-02-03 PROCEDURE — 85025 COMPLETE CBC W/AUTO DIFF WBC: CPT

## 2022-02-03 PROCEDURE — 82947 ASSAY GLUCOSE BLOOD QUANT: CPT

## 2022-02-03 PROCEDURE — G1004: CPT

## 2022-02-03 PROCEDURE — 80307 DRUG TEST PRSMV CHEM ANLYZR: CPT

## 2022-02-03 PROCEDURE — 86850 RBC ANTIBODY SCREEN: CPT

## 2022-02-03 PROCEDURE — 0225U NFCT DS DNA&RNA 21 SARSCOV2: CPT

## 2022-02-03 PROCEDURE — 85610 PROTHROMBIN TIME: CPT

## 2022-02-03 PROCEDURE — 85730 THROMBOPLASTIN TIME PARTIAL: CPT

## 2022-02-03 PROCEDURE — 94640 AIRWAY INHALATION TREATMENT: CPT

## 2022-02-03 PROCEDURE — 80048 BASIC METABOLIC PNL TOTAL CA: CPT

## 2022-02-03 PROCEDURE — 82330 ASSAY OF CALCIUM: CPT

## 2022-02-03 PROCEDURE — 83880 ASSAY OF NATRIURETIC PEPTIDE: CPT

## 2022-02-03 PROCEDURE — 84443 ASSAY THYROID STIM HORMONE: CPT

## 2022-02-03 PROCEDURE — 83690 ASSAY OF LIPASE: CPT

## 2022-02-03 PROCEDURE — 80053 COMPREHEN METABOLIC PANEL: CPT

## 2022-02-03 PROCEDURE — 93880 EXTRACRANIAL BILAT STUDY: CPT

## 2022-02-03 PROCEDURE — 84484 ASSAY OF TROPONIN QUANT: CPT

## 2022-02-03 PROCEDURE — 96374 THER/PROPH/DIAG INJ IV PUSH: CPT

## 2022-02-03 PROCEDURE — 96375 TX/PRO/DX INJ NEW DRUG ADDON: CPT

## 2022-02-03 PROCEDURE — 93005 ELECTROCARDIOGRAM TRACING: CPT

## 2022-02-03 PROCEDURE — 84132 ASSAY OF SERUM POTASSIUM: CPT

## 2022-02-03 PROCEDURE — 93306 TTE W/DOPPLER COMPLETE: CPT

## 2022-02-03 RX ORDER — BACITRACIN 500 [USP'U]/G
1 OINTMENT OPHTHALMIC
Qty: 10 | Refills: 0
Start: 2022-02-03

## 2022-02-03 RX ORDER — TRAMADOL HYDROCHLORIDE 50 MG/1
25 TABLET ORAL EVERY 4 HOURS
Refills: 0 | Status: DISCONTINUED | OUTPATIENT
Start: 2022-02-03 | End: 2022-02-03

## 2022-02-03 RX ORDER — TRAMADOL HYDROCHLORIDE 50 MG/1
50 TABLET ORAL EVERY 4 HOURS
Refills: 0 | Status: DISCONTINUED | OUTPATIENT
Start: 2022-02-03 | End: 2022-02-03

## 2022-02-03 RX ORDER — TRAMADOL HYDROCHLORIDE 50 MG/1
1 TABLET ORAL
Qty: 12 | Refills: 0
Start: 2022-02-03

## 2022-02-03 RX ORDER — ACETAMINOPHEN 500 MG
1000 TABLET ORAL ONCE
Refills: 0 | Status: COMPLETED | OUTPATIENT
Start: 2022-02-03 | End: 2022-02-03

## 2022-02-03 RX ORDER — CEPHALEXIN 500 MG
500 CAPSULE ORAL EVERY 12 HOURS
Refills: 0 | Status: DISCONTINUED | OUTPATIENT
Start: 2022-02-03 | End: 2022-02-03

## 2022-02-03 RX ADMIN — SERTRALINE 50 MILLIGRAM(S): 25 TABLET, FILM COATED ORAL at 13:30

## 2022-02-03 RX ADMIN — Medication 500 MILLIGRAM(S): at 05:19

## 2022-02-03 RX ADMIN — Medication 4 MILLIGRAM(S): at 05:19

## 2022-02-03 RX ADMIN — Medication 4 MILLIGRAM(S): at 13:32

## 2022-02-03 RX ADMIN — Medication 200 MICROGRAM(S): at 05:20

## 2022-02-03 RX ADMIN — BACITRACIN 1 APPLICATION(S): 500 OINTMENT OPHTHALMIC at 05:21

## 2022-02-03 NOTE — PROGRESS NOTE ADULT - ASSESSMENT
60 y/o F s/p syncopal episode w/ right orbital fx    - no nose blowing  - HOB elevated  - d/c w/ cefadroxil 500 mg BID  7 days, medrol dose akila and baci opthalmic  - f/u Tuesday in [place] office  - keep face dry  - please contact me for any acute changes [phone]

## 2022-02-03 NOTE — DISCHARGE NOTE NURSING/CASE MANAGEMENT/SOCIAL WORK - PATIENT PORTAL LINK FT
You can access the FollowMyHealth Patient Portal offered by [facility] by registering at the following website: [email or web address]. By joining [employer]’s FollowMyHealth portal, you will also be able to view your health information using other applications (apps) compatible with our system.

## 2022-02-03 NOTE — DISCHARGE NOTE PROVIDER - CARE PROVIDER_API CALL
Katt Ayala)  Plastic Surgery  [address] [place] [address]  Phone: [phone]  Fax: [phone]  Follow Up Time:     Private, Cardiologist  Phone: (   )    -  Fax: (   )    -  Follow Up Time:

## 2022-02-03 NOTE — CHART NOTE - NSCHARTNOTEFT_GEN_A_CORE
Patient seen and examined at bedside.   Patient is s/p repair of right orbital fx  No events on tele overnight. stable at this time.  Due to be discharged today.  Patient is to follow up with cardiology as O/P for possible event monitor.  Please call with any questions.

## 2022-02-03 NOTE — DISCHARGE NOTE PROVIDER - NSDCCPCAREPLAN_GEN_ALL_CORE_FT
PRINCIPAL DISCHARGE DIAGNOSIS  Diagnosis: Fracture of inferior orbital wall  Assessment and Plan of Treatment:       SECONDARY DISCHARGE DIAGNOSES  Diagnosis: COPD exacerbation  Assessment and Plan of Treatment:     Diagnosis: Syncope  Assessment and Plan of Treatment:

## 2022-02-03 NOTE — DISCHARGE NOTE NURSING/CASE MANAGEMENT/SOCIAL WORK - NSDCPEFALRISK_GEN_ALL_CORE
For information on Fall & Injury Prevention, visit: [email or web address] OR  [email or web address] OR  https://www.cdc.gov/steadi/patient.html

## 2022-02-03 NOTE — PHYSICAL THERAPY INITIAL EVALUATION ADULT - ADDITIONAL COMMENTS
Pt lives in a private home with her spouse and daughter. 0 steps to enter, 0 steps inside. Pt was independent PTA without an assist device. Pt does not own DME.

## 2022-02-03 NOTE — PROGRESS NOTE ADULT - SUBJECTIVE AND OBJECTIVE BOX
SUBJECTIVE/24 hour events:  Patient is a 61yFemale s/p fall sustaining a right orbital fracture with entrapment now pod# of ORIF. Patient with no post-op events, on medrol dos akila, pain controlled on current regimen, will remain on po abx x1 week, keep face dry and apply opthalmic bacitracin. Pt/ot pending. Post-op ct of head completed results pending      Vital Signs Last 24 Hrs  T(C): 36.7 (02 Feb 2022 22:20), Max: 37 (02 Feb 2022 10:03)  T(F): 98.1 (02 Feb 2022 22:20), Max: 98.6 (02 Feb 2022 10:03)  HR: 57 (02 Feb 2022 22:20) (51 - 91)  BP: 128/74 (02 Feb 2022 22:20) (114/70 - 132/83)  BP(mean): 90 (02 Feb 2022 21:30) (82 - 92)  RR: 18 (02 Feb 2022 22:20) (12 - 20)  SpO2: 98% (02 Feb 2022 22:20) (93% - 99%)  Drug Dosing Weight  Height (cm): 172.7 (02 Feb 2022 17:47)  Weight (kg): 72.6 (02 Feb 2022 17:47)  BMI (kg/m2): 24.3 (02 Feb 2022 17:47)  BSA (m2): 1.86 (02 Feb 2022 17:47)  I&O's Detail    02 Feb 2022 07:01  -  03 Feb 2022 01:28  --------------------------------------------------------  IN:    Lactated Ringers: 150 mL  Total IN: 150 mL    OUT:    Voided (mL): 200 mL  Total OUT: 200 mL    Total NET: -50 mL        Allergies    No Known Allergies    Intolerances                              14.3   6.92  )-----------( 276      ( 01 Feb 2022 22:39 )             43.7   02-02    143  |  107  |  18.7  ----------------------------<  131<H>  3.9   |  25.0  |  0.66    Ca    8.1<L>      02 Feb 2022 02:00  Mg     2.2     02-01    TPro  7.3  /  Alb  4.7  /  TBili  <0.2<L>  /  DBili  x   /  AST  34<H>  /  ALT  38<H>  /  AlkPhos  114  02-01  PT/INR - ( 02 Feb 2022 06:46 )   PT: 11.4 sec;   INR: 0.98 ratio         PTT - ( 02 Feb 2022 06:46 )  PTT:28.8 sec    ROS:    PHYSICAL EXAM:  Constitutional: in good spirit spirits   Eyes: frost sutures to right eye well seated, periorbital swelling, eccyhmosis stable   Respiratory: no respiratory distress, no dyspnea, no supplemental o2 needed  Gastrointestinal: soft, non-tender, atraumatic   Genitourinary: voiding spontaneously   Neurological: A&OX3          MEDICATIONS  (STANDING):  BACItracin Ophthalmic Ointment - Peds 1 Application(s) Right EYE two times a day  cephalexin 500 milliGRAM(s) Oral every 12 hours  enoxaparin Injectable 40 milliGRAM(s) SubCutaneous daily  levothyroxine 200 MICROGram(s) Oral daily  methylPREDNISolone 4 milliGRAM(s) Oral before breakfast  methylPREDNISolone 4 milliGRAM(s) Oral after lunch  methylPREDNISolone 4 milliGRAM(s) Oral after dinner  methylPREDNISolone 8 milliGRAM(s) Oral at bedtime  methylPREDNISolone   Oral   sertraline 50 milliGRAM(s) Oral daily    MEDICATIONS  (PRN):  ALBUTerol    90 MICROgram(s) HFA Inhaler 2 Puff(s) Inhalation every 6 hours PRN Shortness of Breath and/or Wheezing      RADIOLOGY STUDIES:    CULTURES:

## 2022-02-03 NOTE — PROGRESS NOTE ADULT - SUBJECTIVE AND OBJECTIVE BOX
Pt is awake and alert.  She offers no acute complaints.  She admits to nose blowing.    T(C): 36.7 (02 Feb 2022 22:20), Max: 37 (02 Feb 2022 10:03)  T(F): 98.1 (02 Feb 2022 22:20), Max: 98.6 (02 Feb 2022 10:03)  HR: 57 (02 Feb 2022 22:20) (51 - 91)  BP: 128/74 (02 Feb 2022 22:20) (114/70 - 132/83)  BP(mean): 90 (02 Feb 2022 21:30) (82 - 92)  RR: 18 (02 Feb 2022 22:20) (12 - 20)  SpO2: 98% (02 Feb 2022 22:20) (93% - 99%)  Drug Dosing Weight  Height (cm): 172.7 (02 Feb 2022 17:47)  Weight (kg): 72.6 (02 Feb 2022 17:47)  BMI (kg/m2): 24.3 (02 Feb 2022 17:47)  BSA (m2): 1.86 (02 Feb 2022 17:47)  I&O's Detail    02 Feb 2022 07:01  -  03 Feb 2022 01:28  --------------------------------------------------------  IN:    Lactated Ringers: 150 mL  Total IN: 150 mL    OUT:    Voided (mL): 200 mL  Total OUT: 200 mL    Total NET: -50 mL        Allergies    No Known Allergies    Intolerances                              14.3   6.92  )-----------( 276      ( 01 Feb 2022 22:39 )             43.7   02-02    143  |  107  |  18.7  ----------------------------<  131<H>  3.9   |  25.0  |  0.66    Ca    8.1<L>      02 Feb 2022 02:00  Mg     2.2     02-01    TPro  7.3  /  Alb  4.7  /  TBili  <0.2<L>  /  DBili  x   /  AST  34<H>  /  ALT  38<H>  /  AlkPhos  114  02-01  PT/INR - ( 02 Feb 2022 06:46 )   PT: 11.4 sec;   INR: 0.98 ratio         PTT - ( 02 Feb 2022 06:46 )  PTT:28.8 sec    ROS:    PHYSICAL EXAM:  General: in no acute distress  HEENT: moderate right periorbital edema, incision is C/D/I, frost in place, no overt sign of infection/dehisence  Extremities: moves all extremities   Pt is awake and alert.  She offers no acute complaints.  She admits to nose blowing.    T(C): 36.7 (02 Feb 2022 22:20), Max: 37 (02 Feb 2022 10:03)  T(F): 98.1 (02 Feb 2022 22:20), Max: 98.6 (02 Feb 2022 10:03)  HR: 57 (02 Feb 2022 22:20) (51 - 91)  BP: 128/74 (02 Feb 2022 22:20) (114/70 - 132/83)  BP(mean): 90 (02 Feb 2022 21:30) (82 - 92)  RR: 18 (02 Feb 2022 22:20) (12 - 20)  SpO2: 98% (02 Feb 2022 22:20) (93% - 99%)  Drug Dosing Weight  Height (cm): 172.7 (02 Feb 2022 17:47)  Weight (kg): 72.6 (02 Feb 2022 17:47)  BMI (kg/m2): 24.3 (02 Feb 2022 17:47)  BSA (m2): 1.86 (02 Feb 2022 17:47)  I&O's Detail    02 Feb 2022 07:01  -  03 Feb 2022 01:28  --------------------------------------------------------  IN:    Lactated Ringers: 150 mL  Total IN: 150 mL    OUT:    Voided (mL): 200 mL  Total OUT: 200 mL    Total NET: -50 mL        Allergies    No Known Allergies    Intolerances                              14.3   6.92  )-----------( 276      ( 01 Feb 2022 22:39 )             43.7   02-02    143  |  107  |  18.7  ----------------------------<  131<H>  3.9   |  25.0  |  0.66    Ca    8.1<L>      02 Feb 2022 02:00  Mg     2.2     02-01    TPro  7.3  /  Alb  4.7  /  TBili  <0.2<L>  /  DBili  x   /  AST  34<H>  /  ALT  38<H>  /  AlkPhos  114  02-01  PT/INR - ( 02 Feb 2022 06:46 )   PT: 11.4 sec;   INR: 0.98 ratio         PTT - ( 02 Feb 2022 06:46 )  PTT:28.8 sec      ACC: [id number] EXAM:  CT MAXILLOFACIAL                          PROCEDURE DATE:  02/02/2022          INTERPRETATION:  CLINICAL HISTORY: Trauma. Status post ORIF right orbit   fracture.    TECHNIQUE: CT of the maxillofacial region without contrast dated  Exam consists of thin section transaxial images through the facial region   with coronal, sagittal and 3-D volume rendered reformatted images   provided from the transaxial source data.    COMPARISON: CT head 2/2/2022    FINDINGS:  Interval fixation comminuted right orbital floor fracture with coarse   metallic plate intact and in appropriate position. The remaining orbital   rims, walls, floors, lamina papyracea and zygomatic arches are intact. No   new facial fractures identified. The temporomandibular joints are located.    The globes are of normal contour and the lenses are located. Mild to   moderate preseptal periorbital swelling likely a combination of   posttraumatic and post procedural change. No focal hematoma. No evidence   of abscess. There is no retrobulbar hematoma. The optic nerve sheath   complexes and extraocular muscles are symmetric and unremarkable   appearing bilaterally. Interval resolved swelling of the inferior right   rectus muscle.    Hemorrhagic air-fluid level in the right maxillary sinus, unchanged.   Trace mucosal thickening in the left maxillary sinus. The remaining   paranasal sinuses appear clear.    The visualized soft tissues of the neck have a unremarkable noncontrast   appearance.    IMPRESSION:  Status post ORIF right orbital floor fracture with fixation hardware in   appropriate position. No new facial fractures identified.    --- End of Report ---        PHYSICAL EXAM:  General: in no acute distress  HEENT: moderate right periorbital edema, incision is C/D/I, frost in place, no overt sign of infection/dehisence  Extremities: moves all extremities

## 2022-02-03 NOTE — DISCHARGE NOTE PROVIDER - NSDCFUADDAPPT_GEN_ALL_CORE_FT
Please call your private cardiologist for further work up of why you fell as soon as possible to further check your heart.  Please go to Plastic surgery clinic on Tuesday as scheduled.  Please call 911 or return to ER For worse pain, fever, eye swelling, change in vision or other concerns.

## 2022-02-03 NOTE — DISCHARGE NOTE PROVIDER - NSDCMRMEDTOKEN_GEN_ALL_CORE_FT
albuterol 0.63 mg/3 mL (0.021%) inhalation solution: 3 milliliter(s) by nebulizer every 6 hours   bacitracin 500 units/g ophthalmic ointment: 1 application to each affected eye 2 times a day x 7 days  cefadroxil 500 mg oral capsule: 1 cap(s) orally 2 times a day x 7 days  levothyroxine 200 mcg (0.2 mg) oral tablet: 1 tab(s) orally once a day  Medrol Dosepak 4 mg oral tablet: take as directed  sertraline 50 mg oral tablet: 1 tab(s) orally once a day  traMADol 50 mg oral tablet: 0.5 - 1 tab(s) orally every 4 hours, As Needed -Moderate Pain (4 - 6) MDD:6   Ventolin HFA 90 mcg/inh inhalation aerosol: 2 puff(s) inhaled every 6 hours

## 2022-02-03 NOTE — DISCHARGE NOTE PROVIDER - PROVIDER TOKENS
PROVIDER:[TOKEN:[[id number]]],FREE:[LAST:[Private],FIRST:[Cardiologist],PHONE:[(   )    -],FAX:[(   )    -]]

## 2022-02-03 NOTE — PROGRESS NOTE ADULT - ASSESSMENT
Patient is a 62 y/o F s/p fall now pod#1 of orif of right orbital fracture  plan:  regular diet  pain control  dvt px  discharge on po abx x1 week (started already)  discharge on medrol dose akila (started already)   opthalmic bacitracin  keep face dry  f/u with corrina wilson next week at the outpatient office for suture removal

## 2022-02-03 NOTE — DISCHARGE NOTE PROVIDER - HOSPITAL COURSE
62 yo female with a PMH of asthma who is presenting to the ED s/p fall. She states that last night around 9:00 PM, she was getting a drink from the refrigerator when she fell and hit the right side of her face. She reports seeing some "spots" just prior to event; believes tried to hold onto chair and hit side of face against corner of chair. Denies any loss of consciousness, and has full recollection of the events.       In the ED, imaging was notable for a right orbital floor fracture with partial entrapment of the right inferior rectus muscle and extraconal fat.     Seen by Cardiology pre op:.  EKG shows NSR, low voltage, no ST-T changes  TTE shows normal LVEF, trivial pericardial effusion   RCRI 0.4% risk of MACE  No further cardiac workup needed prior to planned procedure  Continue cardiac monitoring while inpatient. Would repeat [facility] outpatient Telemetry.    2/2 : Plastic surgery took pt to OR for ORIF.    Post op CT Adequate as reviewed by Plastics.     Had Duplex for Carotid while admitted.    Pt will follow up out pt with private cardiologist ASAP   Pt will Follow up with Plastic surgery on Tuesday.     DC > 30 minutes

## 2022-02-07 ENCOUNTER — APPOINTMENT (OUTPATIENT)
Dept: FAMILY MEDICINE | Facility: CLINIC | Age: 62
End: 2022-02-07
Payer: COMMERCIAL

## 2022-02-07 VITALS — OXYGEN SATURATION: 98 % | HEART RATE: 137 BPM | TEMPERATURE: 97.6 F

## 2022-02-07 VITALS — DIASTOLIC BLOOD PRESSURE: 60 MMHG | HEART RATE: 78 BPM | SYSTOLIC BLOOD PRESSURE: 105 MMHG

## 2022-02-07 VITALS
TEMPERATURE: 97.6 F | OXYGEN SATURATION: 96 % | HEIGHT: 68 IN | BODY MASS INDEX: 25.01 KG/M2 | WEIGHT: 165 LBS | HEART RATE: 88 BPM

## 2022-02-07 PROBLEM — J44.9 CHRONIC OBSTRUCTIVE PULMONARY DISEASE, UNSPECIFIED: Chronic | Status: ACTIVE | Noted: 2022-02-02

## 2022-02-07 PROBLEM — F10.10 ALCOHOL ABUSE, UNCOMPLICATED: Chronic | Status: ACTIVE | Noted: 2022-02-02

## 2022-02-07 PROCEDURE — 99214 OFFICE O/P EST MOD 30 MIN: CPT

## 2022-02-08 ENCOUNTER — NON-APPOINTMENT (OUTPATIENT)
Age: 62
End: 2022-02-08

## 2022-02-24 ENCOUNTER — RX RENEWAL (OUTPATIENT)
Age: 62
End: 2022-02-24

## 2022-02-24 ENCOUNTER — NON-APPOINTMENT (OUTPATIENT)
Age: 62
End: 2022-02-24

## 2022-02-24 ENCOUNTER — APPOINTMENT (OUTPATIENT)
Dept: CARDIOLOGY | Facility: CLINIC | Age: 62
End: 2022-02-24
Payer: COMMERCIAL

## 2022-02-24 VITALS
TEMPERATURE: 98.5 F | HEIGHT: 68 IN | SYSTOLIC BLOOD PRESSURE: 110 MMHG | OXYGEN SATURATION: 94 % | HEART RATE: 73 BPM | WEIGHT: 156 LBS | DIASTOLIC BLOOD PRESSURE: 70 MMHG | BODY MASS INDEX: 23.64 KG/M2

## 2022-02-24 DIAGNOSIS — R55 SYNCOPE AND COLLAPSE: ICD-10-CM

## 2022-02-24 DIAGNOSIS — R56.9 UNSPECIFIED CONVULSIONS: ICD-10-CM

## 2022-02-24 PROCEDURE — 93000 ELECTROCARDIOGRAM COMPLETE: CPT | Mod: 59

## 2022-02-24 PROCEDURE — 99406 BEHAV CHNG SMOKING 3-10 MIN: CPT

## 2022-02-24 PROCEDURE — 93242 EXT ECG>48HR<7D RECORDING: CPT

## 2022-02-24 PROCEDURE — 99213 OFFICE O/P EST LOW 20 MIN: CPT

## 2022-02-24 RX ORDER — LEVOTHYROXINE SODIUM 0.12 MG/1
125 TABLET ORAL DAILY
Qty: 30 | Refills: 2 | Status: DISCONTINUED | COMMUNITY
Start: 2021-01-12 | End: 2022-02-24

## 2022-02-24 RX ORDER — TOBRAMYCIN AND DEXAMETHASONE 3; 1 MG/ML; MG/ML
0.3-0.1 SUSPENSION/ DROPS OPHTHALMIC EVERY 4 HOURS
Qty: 1 | Refills: 1 | Status: DISCONTINUED | COMMUNITY
Start: 2021-04-30 | End: 2022-02-24

## 2022-02-24 RX ORDER — LEVOCETIRIZINE DIHYDROCHLORIDE 5 MG/1
5 TABLET ORAL DAILY
Qty: 10 | Refills: 3 | Status: DISCONTINUED | COMMUNITY
Start: 2021-04-30 | End: 2022-02-24

## 2022-02-24 RX ORDER — ATORVASTATIN CALCIUM 20 MG/1
20 TABLET, FILM COATED ORAL DAILY
Qty: 30 | Refills: 3 | Status: DISCONTINUED | COMMUNITY
Start: 2020-11-04 | End: 2022-02-24

## 2022-02-24 RX ORDER — FLUTICASONE FUROATE AND VILANTEROL TRIFENATATE 200; 25 UG/1; UG/1
200-25 POWDER RESPIRATORY (INHALATION) DAILY
Qty: 3 | Refills: 1 | Status: DISCONTINUED | COMMUNITY
Start: 2020-08-11 | End: 2022-02-24

## 2022-02-24 RX ORDER — FLUTICASONE PROPIONATE AND SALMETEROL 50; 250 UG/1; UG/1
250-50 POWDER RESPIRATORY (INHALATION) TWICE DAILY
Qty: 1 | Refills: 2 | Status: DISCONTINUED | COMMUNITY
Start: 2021-10-13 | End: 2022-02-24

## 2022-02-24 RX ORDER — METHYLPREDNISOLONE 4 MG/1
4 TABLET ORAL
Qty: 1 | Refills: 0 | Status: DISCONTINUED | COMMUNITY
Start: 2021-10-13 | End: 2022-02-24

## 2022-02-24 RX ORDER — FLUTICASONE PROPIONATE 50 UG/1
50 SPRAY, METERED NASAL DAILY
Qty: 1 | Refills: 3 | Status: DISCONTINUED | COMMUNITY
Start: 2020-08-11 | End: 2022-02-24

## 2022-02-24 NOTE — COUNSELING

## 2022-02-24 NOTE — HISTORY OF PRESENT ILLNESS
[FreeTextEntry1] : 62 y/o F with PMH ETOH dependence presents for follow up after recent hospitalization for syncope. Patient states she was walking to the kitchen to get a drink of water, and suddenly lost consciousness. No prodromal symptoms. Was unconsciousness for a few seconds and had +orbital floor fracture and +bowel incontinence. Admits to drinking ETOH during the day. Had a similar episode 5 years ago; was seen by a neurologist at that time, who diagnosed her with seizure related to ETOH. She denies any cardiac history. Denies chest pain, palpitations, dyspnea, dizziness, lightheadedness, or LE edema. She states she is very active with her grandchild and denies exertional symptoms. Active smoker, 1 ppd x 30 years. +ETOH use, in recovery. Denies illicit drug use.

## 2022-02-24 NOTE — ASSESSMENT
[FreeTextEntry1] : 62 y/o F with PMH ETOH dependence presents for follow up after recent hospitalization for syncope. \par \par #Syncope\par Symptoms suggestive of seizure activity\par EKG with no arrhythmias, normal QT\par Was on telemetry with no arrhythmias inpatient\par TTE with no significant abnormality\par [facility]OT for further evaluation \par \par #HLD \par 10/2021: Chol 285  HDL 44 LDL unable to calculate \par Repeat lipid panel\par \par #Tobacco use\par Smoking cessation discussed\par Refused referral to smoking cessation program \par \par RTC 3 months \par Will call with results of MCOT \par Follow up with neurology, PCP \par \par

## 2022-02-24 NOTE — CARDIOLOGY SUMMARY
[de-identified] : 2/24/22: NSR, normal EKG  [de-identified] : 2/2/22: LVEF 70-75%, mildly enlarged RV, mild TR, trivial effusion

## 2022-03-21 ENCOUNTER — APPOINTMENT (OUTPATIENT)
Dept: CARDIOLOGY | Facility: CLINIC | Age: 62
End: 2022-03-21

## 2022-06-15 ENCOUNTER — RX RENEWAL (OUTPATIENT)
Age: 62
End: 2022-06-15

## 2022-06-19 ENCOUNTER — NON-APPOINTMENT (OUTPATIENT)
Age: 62
End: 2022-06-19

## 2022-08-22 ENCOUNTER — NON-APPOINTMENT (OUTPATIENT)
Age: 62
End: 2022-08-22

## 2022-10-12 ENCOUNTER — RX RENEWAL (OUTPATIENT)
Age: 62
End: 2022-10-12

## 2022-10-14 ENCOUNTER — TRANSCRIPTION ENCOUNTER (OUTPATIENT)
Age: 62
End: 2022-10-14

## 2022-10-14 ENCOUNTER — NON-APPOINTMENT (OUTPATIENT)
Age: 62
End: 2022-10-14

## 2022-10-14 ENCOUNTER — APPOINTMENT (OUTPATIENT)
Dept: FAMILY MEDICINE | Facility: CLINIC | Age: 62
End: 2022-10-14

## 2022-10-14 VITALS
RESPIRATION RATE: 14 BRPM | HEART RATE: 104 BPM | SYSTOLIC BLOOD PRESSURE: 120 MMHG | WEIGHT: 174.4 LBS | BODY MASS INDEX: 26.52 KG/M2 | OXYGEN SATURATION: 97 % | TEMPERATURE: 96.9 F | DIASTOLIC BLOOD PRESSURE: 76 MMHG

## 2022-10-14 DIAGNOSIS — M25.562 PAIN IN LEFT KNEE: ICD-10-CM

## 2022-10-14 DIAGNOSIS — E78.5 HYPERLIPIDEMIA, UNSPECIFIED: ICD-10-CM

## 2022-10-14 DIAGNOSIS — Z12.39 ENCOUNTER FOR OTHER SCREENING FOR MALIGNANT NEOPLASM OF BREAST: ICD-10-CM

## 2022-10-14 DIAGNOSIS — Z12.11 ENCOUNTER FOR SCREENING FOR MALIGNANT NEOPLASM OF COLON: ICD-10-CM

## 2022-10-14 DIAGNOSIS — Z23 ENCOUNTER FOR IMMUNIZATION: ICD-10-CM

## 2022-10-14 DIAGNOSIS — Z00.00 ENCOUNTER FOR GENERAL ADULT MEDICAL EXAMINATION W/OUT ABNORMAL FINDINGS: ICD-10-CM

## 2022-10-14 PROCEDURE — G0008: CPT

## 2022-10-14 PROCEDURE — G0296 VISIT TO DETERM LDCT ELIG: CPT | Mod: NC

## 2022-10-14 PROCEDURE — 36415 COLL VENOUS BLD VENIPUNCTURE: CPT

## 2022-10-14 PROCEDURE — 99396 PREV VISIT EST AGE 40-64: CPT | Mod: 25

## 2022-10-14 PROCEDURE — 90686 IIV4 VACC NO PRSV 0.5 ML IM: CPT

## 2022-10-14 PROCEDURE — 93000 ELECTROCARDIOGRAM COMPLETE: CPT

## 2022-10-14 RX ORDER — ALBUTEROL SULFATE 90 UG/1
108 (90 BASE) INHALANT RESPIRATORY (INHALATION)
Qty: 1 | Refills: 3 | Status: DISCONTINUED | COMMUNITY
Start: 2021-06-25 | End: 2022-10-14

## 2022-10-14 NOTE — HISTORY OF PRESENT ILLNESS
[FreeTextEntry1] : Pt is here for CPE. [de-identified] : 60 y/o female with pmhx of mild copd, hld, current smoker, gerd, hypothyroid, presents for CPE. Patient states she is overall doing well. She has been experiencing knee pain for the past 3 days of the left knee. States that she has been using icy hot on it with some relief. Denies any trauma to the area. 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 She has a great support system and has been going to AA every day.

## 2022-10-14 NOTE — REVIEW OF SYSTEMS
[Night Sweats] : night sweats [Fever] : no fever [Chills] : no chills [Chest Pain] : no chest pain [Palpitations] : no palpitations [Lower Ext Edema] : no lower extremity edema [Shortness Of Breath] : no shortness of breath [Wheezing] : no wheezing [Cough] : no cough [Abdominal Pain] : no abdominal pain [Nausea] : no nausea [Constipation] : no constipation [Diarrhea] : diarrhea [Vomiting] : no vomiting [Dysuria] : no dysuria [Hematuria] : no hematuria [Headache] : no headache [Dizziness] : no dizziness [Anxiety] : no anxiety [Depression] : no depression

## 2022-10-14 NOTE — ASSESSMENT
[FreeTextEntry1] : CPE:\par -will order CBC w/ diff, CMP, Lipid, TSH and HgbA1c, labs to be drawn in office\par \par Screening:\par -Breast cancer screening: due, will order mammo\par -Cervical Cancer Screening: due, patient to follow up with gyn\par -Colon Cancer screening: due, will send fit kit\par -Lung Cancer Screening: current smoker of 1 pack per day for past 40 years (40 pack year history), will screen for lung cancer with annual low dose ct\par \par EKG: NSR, unchanged from prior\par \par Vaccinations:\par Seasonal flu  - administered today, tolerated well\par Pneumococcal - recommended\par Shingles - recommended\par s/p covid19 vaccine and booster\par \par -previous notes and labs reviewed, consultants notes reviewed\par -patient expressed understanding of plan, all questions answered\par -follow up in 1 year for annual CPE\par \par Hypothyroidism:\par -will continue current dose of levothyroxine 200mcg daily\par -will check TFTs and make medications adjustments accordingly\par \par Anxiety\par continue zoloft 50mg daily\par \par COPD\par continue albuterol as needed\par \par Acute left knee pain\par likely pes anserine bursitis\par will treat with anti-inflammatories - ibuprofen 600mg q6 hours for 5 days\par continue to treat with heat\par if no improvement will refer to PT and sports medicine\par \par

## 2022-10-14 NOTE — HEALTH RISK ASSESSMENT
[Current] : Current [Patient reported mammogram was normal] : Patient reported mammogram was normal [Patient reported PAP Smear was normal] : Patient reported PAP Smear was normal [No] : In the past 12 months have you used drugs other than those required for medical reasons? No [0] : 2) Feeling down, depressed, or hopeless: Not at all (0) [PHQ-2 Negative - No further assessment needed] : PHQ-2 Negative - No further assessment needed [With Family] : lives with family [Employed] : employed [# Of Children ___] : has [unfilled] children [1] : 1 [de-identified] : 1 pack per day for past 40 years [YMY1Mhyus] : 0 [LowDoseCTScan] : 08/22 [MammogramComments] : unsure of last mammo [PapSmearComments] : unsure of last pap smear

## 2022-10-17 LAB
ALBUMIN SERPL ELPH-MCNC: 4.6 G/DL
ALP BLD-CCNC: 125 U/L
ALT SERPL-CCNC: 17 U/L
ANION GAP SERPL CALC-SCNC: 13 MMOL/L
AST SERPL-CCNC: 14 U/L
BASOPHILS # BLD AUTO: 0.03 K/UL
BASOPHILS NFR BLD AUTO: 0.5 %
BILIRUB SERPL-MCNC: <0.2 MG/DL
BUN SERPL-MCNC: 14 MG/DL
CALCIUM SERPL-MCNC: 9.4 MG/DL
CHLORIDE SERPL-SCNC: 104 MMOL/L
CHOLEST SERPL-MCNC: 218 MG/DL
CO2 SERPL-SCNC: 23 MMOL/L
CREAT SERPL-MCNC: 0.78 MG/DL
EGFR: 87 ML/MIN/1.73M2
EOSINOPHIL # BLD AUTO: 0.15 K/UL
EOSINOPHIL NFR BLD AUTO: 2.5 %
ESTIMATED AVERAGE GLUCOSE: 131 MG/DL
FOLATE SERPL-MCNC: 18.6 NG/ML
GLUCOSE SERPL-MCNC: 109 MG/DL
HBA1C MFR BLD HPLC: 6.2 %
HCT VFR BLD CALC: 40.8 %
HDLC SERPL-MCNC: 34 MG/DL
HGB BLD-MCNC: 12.9 G/DL
IMM GRANULOCYTES NFR BLD AUTO: 0.2 %
LDLC SERPL CALC-MCNC: 109 MG/DL
LYMPHOCYTES # BLD AUTO: 2.11 K/UL
LYMPHOCYTES NFR BLD AUTO: 35.8 %
MAN DIFF?: NORMAL
MCHC RBC-ENTMCNC: 31.3 PG
MCHC RBC-ENTMCNC: 31.6 GM/DL
MCV RBC AUTO: 99 FL
MONOCYTES # BLD AUTO: 0.45 K/UL
MONOCYTES NFR BLD AUTO: 7.6 %
NEUTROPHILS # BLD AUTO: 3.14 K/UL
NEUTROPHILS NFR BLD AUTO: 53.4 %
NONHDLC SERPL-MCNC: 184 MG/DL
PLATELET # BLD AUTO: 244 K/UL
POTASSIUM SERPL-SCNC: 4.2 MMOL/L
PROT SERPL-MCNC: 6.8 G/DL
RBC # BLD: 4.12 M/UL
RBC # FLD: 13.6 %
SODIUM SERPL-SCNC: 140 MMOL/L
T4 FREE SERPL-MCNC: 1 NG/DL
TRIGL SERPL-MCNC: 377 MG/DL
TSH SERPL-ACNC: 0.84 UIU/ML
VIT B12 SERPL-MCNC: 441 PG/ML
WBC # FLD AUTO: 5.89 K/UL

## 2022-11-05 NOTE — PROGRESS NOTE ADULT - ATTENDING COMMENTS
Seen, examined, discussed with team.     --Needs to follow up with her cardiologist on dc (endorses relationship). No

## 2022-11-11 ENCOUNTER — NON-APPOINTMENT (OUTPATIENT)
Age: 62
End: 2022-11-11

## 2022-12-23 ENCOUNTER — TRANSCRIPTION ENCOUNTER (OUTPATIENT)
Age: 62
End: 2022-12-23

## 2022-12-27 ENCOUNTER — APPOINTMENT (OUTPATIENT)
Dept: FAMILY MEDICINE | Facility: CLINIC | Age: 62
End: 2022-12-27

## 2022-12-27 VITALS
RESPIRATION RATE: 14 BRPM | HEART RATE: 85 BPM | OXYGEN SATURATION: 98 % | DIASTOLIC BLOOD PRESSURE: 70 MMHG | SYSTOLIC BLOOD PRESSURE: 110 MMHG | TEMPERATURE: 97.4 F

## 2022-12-27 PROCEDURE — 99213 OFFICE O/P EST LOW 20 MIN: CPT

## 2022-12-27 RX ORDER — IBUPROFEN 600 MG/1
600 TABLET, FILM COATED ORAL EVERY 6 HOURS
Qty: 20 | Refills: 0 | Status: DISCONTINUED | COMMUNITY
Start: 2022-10-14 | End: 2022-12-27

## 2022-12-27 RX ORDER — BENZONATATE 200 MG/1
200 CAPSULE ORAL
Qty: 21 | Refills: 0 | Status: DISCONTINUED | COMMUNITY
Start: 2022-11-12

## 2022-12-27 NOTE — REVIEW OF SYSTEMS
[Fever] : no fever [Chills] : no chills [Chest Pain] : no chest pain [Palpitations] : no palpitations [Lower Ext Edema] : no lower extremity edema [Shortness Of Breath] : shortness of breath [Wheezing] : wheezing [Nausea] : no nausea [Constipation] : no constipation [Diarrhea] : diarrhea [Headache] : no headache [Dizziness] : no dizziness [Anxiety] : no anxiety [Depression] : no depression

## 2022-12-27 NOTE — ASSESSMENT
[FreeTextEntry1] : COPD, with exacerbation, no evidence of infection\par will treat with po prednisone 40mg daily for 5 days\par will start breo ellipta inhaler daily\par continue albuterol inhaler q 4 hours prn cough\par patient to return to office in 1-2 weeks if no improvement\par f/u in 1 month

## 2022-12-27 NOTE — HISTORY OF PRESENT ILLNESS
[Moderate] : moderate [___ Weeks ago] :  [unfilled] weeks ago [Congestion] : congestion [Cough] : cough [Sore Throat] : no sore throat [Wheezing] : wheezing [Chills] : no chills [Anorexia] : no anorexia [Shortness Of Breath] : shortness of breath [Earache] : no earache [Fatigue] : fatigue [Headache] : headache [Fever] : no fever [OTC Remedies] : OTC remedies [At Night] : at night [In Morning] : in the morning [Worsening] : worsening [FreeTextEntry8] : 63 y/o female with pmhx of copd, hld, current smoker, gerd, hypothyroid, presents for acute visit. Patient states that for the past 2 to 3 weeks she has been having shortness of breath and wheezing. States that she has been using her albuterol inhaler now daily every 4 hours. States she is also waking up at night due to coughing. Admits to some chest congestion with a cough productive and clear mucus. Denies any fevers or chills.

## 2022-12-27 NOTE — PHYSICAL EXAM
[No Respiratory Distress] : no respiratory distress  [No Accessory Muscle Use] : no accessory muscle use [No Edema] : there was no peripheral edema [Coordination Grossly Intact] : coordination grossly intact [No Focal Deficits] : no focal deficits [Normal Gait] : normal gait [Normal] : affect was normal and insight and judgment were intact [de-identified] : diffuse wheezing in b/l lung fields

## 2023-01-11 ENCOUNTER — APPOINTMENT (OUTPATIENT)
Dept: PULMONOLOGY | Facility: CLINIC | Age: 63
End: 2023-01-11
Payer: COMMERCIAL

## 2023-01-11 VITALS
HEART RATE: 96 BPM | DIASTOLIC BLOOD PRESSURE: 72 MMHG | WEIGHT: 168 LBS | HEIGHT: 68 IN | RESPIRATION RATE: 16 BRPM | OXYGEN SATURATION: 95 % | BODY MASS INDEX: 25.46 KG/M2 | SYSTOLIC BLOOD PRESSURE: 126 MMHG

## 2023-01-11 PROCEDURE — 99214 OFFICE O/P EST MOD 30 MIN: CPT | Mod: 25

## 2023-01-11 PROCEDURE — 99406 BEHAV CHNG SMOKING 3-10 MIN: CPT

## 2023-01-19 ENCOUNTER — APPOINTMENT (OUTPATIENT)
Dept: FAMILY MEDICINE | Facility: CLINIC | Age: 63
End: 2023-01-19
Payer: COMMERCIAL

## 2023-01-19 VITALS
HEART RATE: 86 BPM | DIASTOLIC BLOOD PRESSURE: 64 MMHG | TEMPERATURE: 97.3 F | SYSTOLIC BLOOD PRESSURE: 100 MMHG | RESPIRATION RATE: 14 BRPM | OXYGEN SATURATION: 98 %

## 2023-01-19 DIAGNOSIS — F10.21 ALCOHOL DEPENDENCE, IN REMISSION: ICD-10-CM

## 2023-01-19 DIAGNOSIS — F17.210 NICOTINE DEPENDENCE, CIGARETTES, UNCOMPLICATED: ICD-10-CM

## 2023-01-19 PROCEDURE — 99406 BEHAV CHNG SMOKING 3-10 MIN: CPT

## 2023-01-19 PROCEDURE — 99214 OFFICE O/P EST MOD 30 MIN: CPT | Mod: 25

## 2023-01-19 RX ORDER — PREDNISONE 20 MG/1
20 TABLET ORAL
Qty: 10 | Refills: 0 | Status: DISCONTINUED | COMMUNITY
Start: 2022-12-27 | End: 2023-01-19

## 2023-01-19 RX ORDER — NICOTINE 21 MG/24HR
14 PATCH, TRANSDERMAL 24 HOURS TRANSDERMAL
Qty: 2 | Refills: 0 | Status: ACTIVE | COMMUNITY
Start: 2023-01-19 | End: 1900-01-01

## 2023-01-19 RX ORDER — PHENYLEPHRINE HCL 10 MG
7 TABLET ORAL
Qty: 1 | Refills: 0 | Status: ACTIVE | COMMUNITY
Start: 2023-01-19 | End: 1900-01-01

## 2023-01-19 RX ORDER — NICOTINE 21 MG/24HR
21 PATCH, TRANSDERMAL 24 HOURS TRANSDERMAL
Qty: 28 | Refills: 0 | Status: ACTIVE | COMMUNITY
Start: 2023-01-19 | End: 1900-01-01

## 2023-01-19 NOTE — PHYSICAL EXAM
[No Respiratory Distress] : no respiratory distress  [No Accessory Muscle Use] : no accessory muscle use [No Edema] : there was no peripheral edema [Coordination Grossly Intact] : coordination grossly intact [No Focal Deficits] : no focal deficits [Normal Gait] : normal gait [Normal] : affect was normal and insight and judgment were intact [de-identified] : mild wheezing in b/l lung fields

## 2023-01-19 NOTE — ASSESSMENT
[FreeTextEntry1] : COPD\par improved s/p prednisone and doxycycline\par continue breo ellipta inhaler daily\par continue albuterol inhaler q 4 hours prn \par \par CLAUDIA\par will be completing at home sleep study\par \par S/P CT lung cancer screening 1/4/23. Patient with finding of new groundglass or nonsolid nodules in both upper lobes, largest measuring up to 1.2 cm in the right upper lobe, which may be inflammatory or malignant. Lung RADS category 4A \par Recommended for repeat CT in 6 months as per pulm Dr. Villagran\par \par Current smoker\par Patient was extensively counseled on the importance of smoking cessation. Discussed the risks of long term tobacco use including worsening of COPD and lung cancer. Patient interested in quitting\par Will prescribe nicotine patches - 21mg daily for 4 weeks then 14mg daily for 2 weeks then 7 mg daily for 2 weeks, then stop\par \par Hypothyroidism:\par -will continue current dose of levothyroxine 200mcg daily\par \par Anxiety\par continue zoloft 50mg daily

## 2023-01-19 NOTE — REVIEW OF SYSTEMS
[Wheezing] : wheezing [Fever] : no fever [Chills] : no chills [Chest Pain] : no chest pain [Palpitations] : no palpitations [Lower Ext Edema] : no lower extremity edema [Shortness Of Breath] : no shortness of breath [Nausea] : no nausea [Constipation] : no constipation [Diarrhea] : diarrhea [Headache] : no headache [Dizziness] : no dizziness [Anxiety] : no anxiety [Depression] : no depression

## 2023-01-19 NOTE — HISTORY OF PRESENT ILLNESS
[FreeTextEntry1] : Pt is here to follow up after pulmonologist results. [de-identified] : 62 y/o female with pmhx of mild copd, hld, current smoker, gerd, hypothyroid, presents for follow up. Patient underwent CT lung cancer screening. Patient with finding of new groundglass or nonsolid nodules in both upper lobes, largest measuring up to 1.2 cm in the right upper lobe, which may be inflammatory or malignant. Lung RADS category 4A. Patient was seen by pulmonlogy Dr. Villagran. He recommended patient go for a repeat CT scan in 6 months (7/2023). She completed a course of doxycycline for possible infectious component\par \par States she has overall been feeling well. She is coming up on 1 year of soberity. She is still attending daily AA meetings. She is still a current smoker and is interested in quitting. She has tried Chantix in the past but states it gave her weird dreams

## 2023-01-25 ENCOUNTER — OUTPATIENT (OUTPATIENT)
Dept: OUTPATIENT SERVICES | Facility: HOSPITAL | Age: 63
LOS: 1 days | End: 2023-01-25
Payer: COMMERCIAL

## 2023-01-25 DIAGNOSIS — G47.33 OBSTRUCTIVE SLEEP APNEA (ADULT) (PEDIATRIC): ICD-10-CM

## 2023-01-25 PROCEDURE — 95800 SLP STDY UNATTENDED: CPT

## 2023-02-10 NOTE — DISCUSSION/SUMMARY
[FreeTextEntry1] : Assess\par \par Mild COPD\par Allergic rhinitis\par GERD\par Nicotine dependence \par Upper lung Ground Glass Opacities without solid component - at worst - very slow growing malignancy in absence of solid component - more likely inflammatory or infectious\par Suspect CLAUDIA\par \par Plan\par \par FU CT in 6 months not 3 - July of 2023\par Doxycycline\par Truncal elevation of 30 degrees for sleep.  Avoid eating within 90 minutes of the hour of sleep.\par Home Sleep Test\par One month FU

## 2023-02-10 NOTE — HISTORY OF PRESENT ILLNESS
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  No solid component \par Stopped drinking\par Still smoking\par + GERD\par + loud snoring \par Cough with sputum better post steroid taper  [ESS] : 9

## 2023-02-16 NOTE — ED PROVIDER NOTE - WR ORDER DATE AND TIME 1
Received message from dermatologist that patient is wanting a GI follow up.     Pt last seen on 1/4/23 for follow up after H pylori treatment. She reported feeling well and was advised to repeat h pylori stool test to confirm eradication. This has not been completed as of yet. Can we contact patient to reminder her to complete this and also see if she is having any GI symptoms of concern. We can schedule her for a follow up at her earliest convenience.     Thank you,     Janie Rangel PA-C  Division of Gastroenterology, Hepatology and Nutrition   [facility]    01-Feb-2022 23:09

## 2023-02-21 ENCOUNTER — APPOINTMENT (OUTPATIENT)
Dept: PULMONOLOGY | Facility: CLINIC | Age: 63
End: 2023-02-21
Payer: COMMERCIAL

## 2023-02-21 VITALS
BODY MASS INDEX: 25.46 KG/M2 | OXYGEN SATURATION: 98 % | SYSTOLIC BLOOD PRESSURE: 120 MMHG | RESPIRATION RATE: 16 BRPM | HEART RATE: 95 BPM | HEIGHT: 68 IN | WEIGHT: 168 LBS | DIASTOLIC BLOOD PRESSURE: 80 MMHG

## 2023-02-21 DIAGNOSIS — R91.8 OTHER NONSPECIFIC ABNORMAL FINDING OF LUNG FIELD: ICD-10-CM

## 2023-02-21 PROCEDURE — 99213 OFFICE O/P EST LOW 20 MIN: CPT

## 2023-02-21 RX ORDER — DOXYCYCLINE HYCLATE 100 MG/1
100 CAPSULE ORAL
Qty: 20 | Refills: 0 | Status: DISCONTINUED | COMMUNITY
Start: 2023-01-11 | End: 2023-02-21

## 2023-04-15 ENCOUNTER — NON-APPOINTMENT (OUTPATIENT)
Age: 63
End: 2023-04-15

## 2023-05-26 ENCOUNTER — NON-APPOINTMENT (OUTPATIENT)
Age: 63
End: 2023-05-26

## 2023-05-26 NOTE — H&P ADULT - ATTENDING SUPERVISION STATEMENT
Consent: The patient's consent was obtained including but not limited to risks of crusting, scabbing, blistering, scarring, darker or lighter pigmentary change, recurrence, incomplete removal and infection.
Show Aperture Variable?: Yes
Detail Level: Detailed
Render Note In Bullet Format When Appropriate: No
Duration Of Freeze Thaw-Cycle (Seconds): 1
Post-Care Instructions: I reviewed with the patient in detail post-care instructions. Patient is to wear sunprotection, and avoid picking at any of the treated lesions. Pt may apply Vaseline to crusted or scabbing areas.
Number Of Freeze-Thaw Cycles: 2 freeze-thaw cycles
ACP/Resident

## 2023-06-21 ENCOUNTER — NON-APPOINTMENT (OUTPATIENT)
Age: 63
End: 2023-06-21

## 2023-07-10 ENCOUNTER — TRANSCRIPTION ENCOUNTER (OUTPATIENT)
Age: 63
End: 2023-07-10

## 2023-07-10 RX ORDER — ALBUTEROL SULFATE 90 UG/1
108 (90 BASE) INHALANT RESPIRATORY (INHALATION)
Qty: 1 | Refills: 2 | Status: ACTIVE | COMMUNITY
Start: 2021-10-13 | End: 1900-01-01

## 2023-07-13 ENCOUNTER — APPOINTMENT (OUTPATIENT)
Dept: FAMILY MEDICINE | Facility: CLINIC | Age: 63
End: 2023-07-13
Payer: COMMERCIAL

## 2023-07-13 VITALS
SYSTOLIC BLOOD PRESSURE: 100 MMHG | BODY MASS INDEX: 25.46 KG/M2 | HEART RATE: 95 BPM | WEIGHT: 168 LBS | OXYGEN SATURATION: 99 % | HEIGHT: 68 IN | DIASTOLIC BLOOD PRESSURE: 69 MMHG | TEMPERATURE: 97.3 F | RESPIRATION RATE: 16 BRPM

## 2023-07-13 PROCEDURE — 99213 OFFICE O/P EST LOW 20 MIN: CPT

## 2023-07-13 RX ORDER — ALBUTEROL SULFATE 2.5 MG/3ML
(2.5 MG/3ML) SOLUTION RESPIRATORY (INHALATION)
Qty: 2 | Refills: 0 | Status: ACTIVE | COMMUNITY
Start: 2018-10-20 | End: 1900-01-01

## 2023-07-13 NOTE — PHYSICAL EXAM
[No Respiratory Distress] : no respiratory distress  [No Accessory Muscle Use] : no accessory muscle use [No Edema] : there was no peripheral edema [Coordination Grossly Intact] : coordination grossly intact [No Focal Deficits] : no focal deficits [Normal Gait] : normal gait [Normal] : affect was normal and insight and judgment were intact [de-identified] : wheezing in b/l lung fields

## 2023-07-13 NOTE — ASSESSMENT
[FreeTextEntry1] : COPD/emphysema, with exacerbation, no evidence of infection\par will treat with po prednisone 50mg daily for 5 days\par continue breo ellipta inhaler daily\par continue albuterol q 4 hours prn \par current smoker, encouraged smoking cessation\par patient to return to office in 3-4 weeks for follow up

## 2023-07-13 NOTE — HISTORY OF PRESENT ILLNESS
[FreeTextEntry8] : 61 y/o female with pmhx of copd, hld, current smoker, gerd, hypothyroid, presents for acute visit.\par \par Patient has been having increased shortness of breath for the past 2 months. Has been having cough, sometimes dry, sometimes productive of clear or pale mucus. Denies fevers or chills. Has been having nasal congestion. Patient has been taking claritin daily, with out any improvement. Has been using albuterol nebulizer every 4-6 hours as well as rescue inhaler. Patient was off breo inhaler for a few months. States she just started it again 2 days ago, now on day 3. Patient is currently smoking still, about 1 pack a day. Patient has been to urgent care multiple times over the past few months. Last took prednisone june 22nd prednisone 40mg daily for 5 days.

## 2023-07-14 NOTE — HISTORY OF PRESENT ILLNESS
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  No solid component \par Stopped drinking\par Still smoking\par + GERD\par + loud snoring \par Cough with sputum better post steroid taper \par \par 23\par Satisfied with her sleep \par Snoring and fatigue are not a problem for her  [ESS] : 9

## 2023-07-14 NOTE — DISCUSSION/SUMMARY
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  Avoid eating within 90 minutes of the hour of sleep.\par 6 month FU

## 2023-08-03 ENCOUNTER — APPOINTMENT (OUTPATIENT)
Dept: FAMILY MEDICINE | Facility: CLINIC | Age: 63
End: 2023-08-03
Payer: COMMERCIAL

## 2023-08-03 VITALS
BODY MASS INDEX: 25.46 KG/M2 | DIASTOLIC BLOOD PRESSURE: 68 MMHG | HEIGHT: 68 IN | RESPIRATION RATE: 16 BRPM | WEIGHT: 168 LBS | SYSTOLIC BLOOD PRESSURE: 100 MMHG | HEART RATE: 89 BPM

## 2023-08-03 DIAGNOSIS — J43.9 EMPHYSEMA, UNSPECIFIED: ICD-10-CM

## 2023-08-03 PROCEDURE — 99213 OFFICE O/P EST LOW 20 MIN: CPT

## 2023-08-03 NOTE — HISTORY OF PRESENT ILLNESS
[FreeTextEntry1] : follow up [de-identified] : 61 y/o female with pmhx of copd, hld, current smoker, gerd, hypothyroid, presents for follow up  At last visit, patient had been having increased shortness of breath for 2 months. She was also having cough, sometimes dry, sometimes productive of clear or pale mucus. Denies fevers or chills. Has been having nasal congestion. Patient had been taking claritin daily, with out any improvement. Had been using albuterol nebulizer every 4-6 hours as well as rescue inhaler. Patient was off breo inhaler for a few months.  Patient was treated with a 5 day course of prednisone 50mg daily. Patient also restarted her breo ellipta inhaler daily. States all of her symptoms have improved. Denies shortness of breath, wheezing or cough

## 2023-08-03 NOTE — PHYSICAL EXAM
[No Respiratory Distress] : no respiratory distress  [No Accessory Muscle Use] : no accessory muscle use [Clear to Auscultation] : lungs were clear to auscultation bilaterally [No Edema] : there was no peripheral edema [Coordination Grossly Intact] : coordination grossly intact [No Focal Deficits] : no focal deficits [Normal Gait] : normal gait [Normal] : affect was normal and insight and judgment were intact

## 2023-08-03 NOTE — ASSESSMENT
[FreeTextEntry1] : COPD/emphysema, with exacerbation, now resolved, patient feels her symptoms have all resolved s/p po prednisone 50mg daily for 5 days continue breo ellipta inhaler daily continue albuterol q 4 hours prn  current smoker, encouraged smoking cessation patient to follow up with pulmonologist

## 2023-08-03 NOTE — REVIEW OF SYSTEMS
[Fever] : no fever [Chills] : no chills [Chest Pain] : no chest pain [Palpitations] : no palpitations [Lower Ext Edema] : no lower extremity edema [Shortness Of Breath] : no shortness of breath [Wheezing] : wheezing [Nausea] : no nausea [Constipation] : no constipation [Diarrhea] : diarrhea [Headache] : no headache [Dizziness] : no dizziness [Anxiety] : no anxiety [Depression] : no depression

## 2023-08-22 ENCOUNTER — APPOINTMENT (OUTPATIENT)
Dept: PULMONOLOGY | Facility: CLINIC | Age: 63
End: 2023-08-22
Payer: COMMERCIAL

## 2023-08-22 VITALS
SYSTOLIC BLOOD PRESSURE: 116 MMHG | BODY MASS INDEX: 25.16 KG/M2 | HEART RATE: 78 BPM | WEIGHT: 166 LBS | HEIGHT: 68 IN | OXYGEN SATURATION: 96 % | DIASTOLIC BLOOD PRESSURE: 78 MMHG

## 2023-08-22 DIAGNOSIS — K21.9 GASTRO-ESOPHAGEAL REFLUX DISEASE W/OUT ESOPHAGITIS: ICD-10-CM

## 2023-08-22 DIAGNOSIS — R93.89 ABNORMAL FINDINGS ON DIAGNOSTIC IMAGING OF OTHER SPECIFIED BODY STRUCTURES: ICD-10-CM

## 2023-08-22 DIAGNOSIS — Z87.891 PERSONAL HISTORY OF NICOTINE DEPENDENCE: ICD-10-CM

## 2023-08-22 DIAGNOSIS — G47.33 OBSTRUCTIVE SLEEP APNEA (ADULT) (PEDIATRIC): ICD-10-CM

## 2023-08-22 DIAGNOSIS — J44.9 CHRONIC OBSTRUCTIVE PULMONARY DISEASE, UNSPECIFIED: ICD-10-CM

## 2023-08-22 PROCEDURE — 99406 BEHAV CHNG SMOKING 3-10 MIN: CPT

## 2023-08-22 PROCEDURE — 99213 OFFICE O/P EST LOW 20 MIN: CPT | Mod: 25

## 2023-08-22 RX ORDER — PREDNISONE 50 MG/1
50 TABLET ORAL DAILY
Qty: 5 | Refills: 0 | Status: DISCONTINUED | COMMUNITY
Start: 2023-07-13 | End: 2023-08-22

## 2023-08-22 NOTE — DISCUSSION/SUMMARY
[FreeTextEntry1] : Assess  Mild COPD Allergic rhinitis GERD Nicotine dependence  Upper lung Ground Glass Opacities without solid component - resolved Moderate CLAUDIA without overt clinical consequence   Plan  FU CT July of 2024 Defer Rx for CLAUDIA at patient request  Continue Breo Truncal elevation of 30 degrees for sleep.  Avoid eating within 90 minutes of the hour of sleep. 6 month FU

## 2023-08-22 NOTE — HISTORY OF PRESENT ILLNESS
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  No solid component  Stopped drinking Still smoking + GERD + loud snoring  Cough with sputum better post steroid taper   23 Satisfied with her sleep  Snoring and fatigue are not a problem for her   23 Still smoking 2 years free of ETOH Sleeping Well 5 minute discussion of smoking related illness, smoke cessation methods and smoke cessation resources [ESS] : 9

## 2023-11-05 ENCOUNTER — NON-APPOINTMENT (OUTPATIENT)
Age: 63
End: 2023-11-05

## 2023-11-17 RX ORDER — FLUTICASONE FUROATE AND VILANTEROL TRIFENATATE 100; 25 UG/1; UG/1
100-25 POWDER RESPIRATORY (INHALATION)
Qty: 3 | Refills: 0 | Status: DISCONTINUED | COMMUNITY
Start: 2022-12-27 | End: 2023-11-17

## 2024-01-04 ENCOUNTER — NON-APPOINTMENT (OUTPATIENT)
Age: 64
End: 2024-01-04

## 2024-01-17 ENCOUNTER — APPOINTMENT (OUTPATIENT)
Dept: FAMILY MEDICINE | Facility: CLINIC | Age: 64
End: 2024-01-17
Payer: COMMERCIAL

## 2024-01-17 VITALS
SYSTOLIC BLOOD PRESSURE: 110 MMHG | HEART RATE: 76 BPM | DIASTOLIC BLOOD PRESSURE: 70 MMHG | BODY MASS INDEX: 25.01 KG/M2 | HEIGHT: 68 IN | OXYGEN SATURATION: 95 % | WEIGHT: 165 LBS

## 2024-01-17 DIAGNOSIS — H81.12 BENIGN PAROXYSMAL VERTIGO, LEFT EAR: ICD-10-CM

## 2024-01-17 DIAGNOSIS — E03.9 HYPOTHYROIDISM, UNSPECIFIED: ICD-10-CM

## 2024-01-17 PROCEDURE — 99214 OFFICE O/P EST MOD 30 MIN: CPT

## 2024-01-17 RX ORDER — METHYLPREDNISOLONE 4 MG/1
4 TABLET ORAL
Qty: 1 | Refills: 0 | Status: ACTIVE | COMMUNITY
Start: 2024-01-17 | End: 1900-01-01

## 2024-01-17 RX ORDER — LEVOTHYROXINE SODIUM 0.2 MG/1
200 TABLET ORAL
Qty: 90 | Refills: 2 | Status: ACTIVE | COMMUNITY
Start: 2021-10-14 | End: 1900-01-01

## 2024-01-17 NOTE — PHYSICAL EXAM
[Normal] : normal rate, regular rhythm, normal S1 and S2 and no murmur heard [de-identified] : Mild left-sided maxillary sinus tenderness, left TM with mild erythema and effusion

## 2024-01-17 NOTE — ASSESSMENT
[FreeTextEntry1] : Vertigo-likely vestibular dysfunction from left otitis media.  She recently completed course of antibiotics, will give her a Medrol Dosepak to decrease inflammation.  Also instructed her to try Epley maneuver supervised at home and if no improvement, consider formal vestibular PT  Hypothyroidism-stable on levothyroxine, refill sent  Return if symptoms worsen or do not improve

## 2024-01-17 NOTE — HISTORY OF PRESENT ILLNESS
[FreeTextEntry8] : 62 yo patient presents to office due to a recent urgent care visit for vertigo and headaches. She reports it has not resolved.  She completed course of abx two days ago and she states her cold symptoms have largely resolved, but still has some tenderness in the left side of her neck. She is still having episodes of vertigo about 5x/day, slightly decreasing in frequency but still occurring.  She tried the meclizine but it made her very drowsy.

## 2024-02-03 ENCOUNTER — NON-APPOINTMENT (OUTPATIENT)
Age: 64
End: 2024-02-03

## 2024-02-05 ENCOUNTER — TRANSCRIPTION ENCOUNTER (OUTPATIENT)
Age: 64
End: 2024-02-05

## 2024-02-05 RX ORDER — SERTRALINE HYDROCHLORIDE 50 MG/1
50 TABLET, FILM COATED ORAL
Qty: 90 | Refills: 2 | Status: ACTIVE | COMMUNITY
Start: 1900-01-01 | End: 1900-01-01

## 2024-03-07 ENCOUNTER — TRANSCRIPTION ENCOUNTER (OUTPATIENT)
Age: 64
End: 2024-03-07

## 2024-04-30 ENCOUNTER — APPOINTMENT (OUTPATIENT)
Dept: INTERNAL MEDICINE | Facility: CLINIC | Age: 64
End: 2024-04-30
Payer: COMMERCIAL

## 2024-04-30 VITALS
WEIGHT: 165 LBS | BODY MASS INDEX: 25.01 KG/M2 | DIASTOLIC BLOOD PRESSURE: 55 MMHG | SYSTOLIC BLOOD PRESSURE: 105 MMHG | HEIGHT: 68 IN

## 2024-04-30 DIAGNOSIS — M79.672 PAIN IN LEFT FOOT: ICD-10-CM

## 2024-04-30 DIAGNOSIS — R53.83 OTHER FATIGUE: ICD-10-CM

## 2024-04-30 PROCEDURE — 99214 OFFICE O/P EST MOD 30 MIN: CPT

## 2024-04-30 PROCEDURE — 36415 COLL VENOUS BLD VENIPUNCTURE: CPT

## 2024-04-30 RX ORDER — NAPROXEN 500 MG/1
500 TABLET ORAL
Qty: 8 | Refills: 0 | Status: ACTIVE | COMMUNITY
Start: 2024-04-30 | End: 1900-01-01

## 2024-04-30 NOTE — PHYSICAL EXAM
[Normal] : normal rate, regular rhythm, normal S1 and S2 and no murmur heard [de-identified] : left  to palpation of medial heel, no deformity or erythema

## 2024-04-30 NOTE — HISTORY OF PRESENT ILLNESS
[FreeTextEntry1] : foot pain [de-identified] : 64 y/o female presents for left foot pain  Patient with pain in the left foot for the past few months. Has pain and numbness in the heel. Pain is made worse but first getting up in the morning and starting to walk. Also gets worse after being on her feet all day. Taking Tylenol which is helpful. Denies any weakness. No knee or lower back pain  Patient with fatigue for the past few months. States she is sleeping well at night. Drinks 16 oz of coffee in the morning.

## 2024-04-30 NOTE — ASSESSMENT
[FreeTextEntry1] : Left foot pain likely plantar fasciitis will check xray to r/o bone spur will treat with naproxen 500mg bid for 3-4 days recommend shoes with arch support if no improvement, will refer to podiatry  Fatigue pt attempts to persistent fatigue will check cbc with diff, cmp, TFTs, vitamin b12 and folate, hgba1c recommend increase oral hydration, decreasing caffeine intake, increasing physical activity and work on improving diet to help combat fatigue

## 2024-05-02 DIAGNOSIS — R94.4 ABNORMAL RESULTS OF KIDNEY FUNCTION STUDIES: ICD-10-CM

## 2024-05-02 LAB
ALBUMIN SERPL ELPH-MCNC: 4.9 G/DL
ALP BLD-CCNC: 111 U/L
ALT SERPL-CCNC: 20 U/L
ANION GAP SERPL CALC-SCNC: 13 MMOL/L
AST SERPL-CCNC: 18 U/L
BASOPHILS # BLD AUTO: 0.05 K/UL
BASOPHILS NFR BLD AUTO: 0.7 %
BILIRUB SERPL-MCNC: 0.2 MG/DL
BUN SERPL-MCNC: 19 MG/DL
CALCIUM SERPL-MCNC: 9.4 MG/DL
CHLORIDE SERPL-SCNC: 102 MMOL/L
CO2 SERPL-SCNC: 26 MMOL/L
CREAT SERPL-MCNC: 1.11 MG/DL
EGFR: 56 ML/MIN/1.73M2
EOSINOPHIL # BLD AUTO: 0.16 K/UL
EOSINOPHIL NFR BLD AUTO: 2.1 %
ESTIMATED AVERAGE GLUCOSE: 123 MG/DL
GLUCOSE SERPL-MCNC: 118 MG/DL
HBA1C MFR BLD HPLC: 5.9 %
HCT VFR BLD CALC: 42.1 %
HGB BLD-MCNC: 13.2 G/DL
IMM GRANULOCYTES NFR BLD AUTO: 0.3 %
LYMPHOCYTES # BLD AUTO: 2.32 K/UL
LYMPHOCYTES NFR BLD AUTO: 31.1 %
MAN DIFF?: NORMAL
MCHC RBC-ENTMCNC: 31.4 GM/DL
MCHC RBC-ENTMCNC: 32.3 PG
MCV RBC AUTO: 102.9 FL
MONOCYTES # BLD AUTO: 0.45 K/UL
MONOCYTES NFR BLD AUTO: 6 %
NEUTROPHILS # BLD AUTO: 4.46 K/UL
NEUTROPHILS NFR BLD AUTO: 59.8 %
PLATELET # BLD AUTO: 241 K/UL
POTASSIUM SERPL-SCNC: 4.2 MMOL/L
PROT SERPL-MCNC: 7.1 G/DL
RBC # BLD: 4.09 M/UL
RBC # FLD: 13.3 %
SODIUM SERPL-SCNC: 141 MMOL/L
T4 FREE SERPL-MCNC: 1.2 NG/DL
TSH SERPL-ACNC: 2.19 UIU/ML
VIT B12 SERPL-MCNC: 434 PG/ML
WBC # FLD AUTO: 7.46 K/UL

## 2024-05-10 LAB
ANION GAP SERPL CALC-SCNC: 13 MMOL/L
BUN SERPL-MCNC: 16 MG/DL
CALCIUM SERPL-MCNC: 9.6 MG/DL
CHLORIDE SERPL-SCNC: 105 MMOL/L
CO2 SERPL-SCNC: 26 MMOL/L
CREAT SERPL-MCNC: 0.89 MG/DL
EGFR: 73 ML/MIN/1.73M2
GLUCOSE SERPL-MCNC: 121 MG/DL
POTASSIUM SERPL-SCNC: 5 MMOL/L
SODIUM SERPL-SCNC: 144 MMOL/L

## 2024-05-18 ENCOUNTER — NON-APPOINTMENT (OUTPATIENT)
Age: 64
End: 2024-05-18

## 2024-06-18 ENCOUNTER — TRANSCRIPTION ENCOUNTER (OUTPATIENT)
Age: 64
End: 2024-06-18

## 2024-06-18 RX ORDER — FLUTICASONE FUROATE AND VILANTEROL TRIFENATATE 100; 25 UG/1; UG/1
100-25 POWDER RESPIRATORY (INHALATION) DAILY
Qty: 2 | Refills: 1 | Status: ACTIVE | COMMUNITY
Start: 2023-11-17 | End: 1900-01-01

## 2024-08-26 NOTE — OCCUPATIONAL THERAPY INITIAL EVALUATION ADULT - FINE MOTOR COORDINATION EXAM
Chief Complaint   Patient presents with    Hypertension    Arthritis    Gastroesophageal Reflux       Have you seen any other physician or provider since your last visit no    Have you had any other diagnostic tests since your last visit? no    Have you changed or stopped any medications since your last visit? no     I have recommended that this patient have a mammogram, immunization for pneumonia, and sigmoidoscopy but she due to refusal reason: not comfortable with test   I have discussed the risks and benefits of this examination with her. The patient verbalizes understanding.  Provider will be informed of refusal.       SUBJECTIVE:    Patient ID:Camryn Yo is a 75 y.o. female.    Chief Complaint   Patient presents with    Hypertension    Arthritis    Gastroesophageal Reflux     HPI:  Patient is here for her follow up for GERD,arthritis and blood pressure. She is not on blood pressure medication. She took her BP last and it was 133/81,119/69,121/67, and 114/70.    Patient is still having arthritis pain. She has stopped taking Ibuprofen.     Patient was diagnosed with GERD. She has been on medication with good response. Patient has been compliant with diet.    She is taking D3 daily and b12   Patient's medications, allergies, past medical, surgical, social and family histories were reviewed and updated as appropriate.          Review of Systems   Constitutional: Negative.    HENT: Negative.     Eyes: Negative.    Cardiovascular: Negative.    Gastrointestinal: Negative.    Endocrine: Negative.    Genitourinary: Negative.    Musculoskeletal:  Positive for arthralgias.   Skin: Negative.    Allergic/Immunologic: Negative.    Neurological: Negative.    Hematological: Negative.    Psychiatric/Behavioral: Negative.         OBJECTIVE:  BP (!) 142/84 (Site: Right Upper Arm, Position: Sitting, Cuff Size: Medium Adult)   Pulse 92   Temp 96.9 °F (36.1 °C) (Temporal)   Resp 16   Ht 1.6 m (5' 2.99\")   Wt 96.9 kg  Left UE/Right UE

## 2024-09-13 ENCOUNTER — NON-APPOINTMENT (OUTPATIENT)
Age: 64
End: 2024-09-13

## 2024-10-14 ENCOUNTER — EMERGENCY (EMERGENCY)
Facility: HOSPITAL | Age: 64
LOS: 1 days | Discharge: HOME | End: 2024-10-14
Attending: EMERGENCY MEDICINE
Payer: COMMERCIAL

## 2024-10-14 ENCOUNTER — NON-APPOINTMENT (OUTPATIENT)
Age: 64
End: 2024-10-14

## 2024-10-14 VITALS
DIASTOLIC BLOOD PRESSURE: 76 MMHG | SYSTOLIC BLOOD PRESSURE: 122 MMHG | WEIGHT: 177.47 LBS | RESPIRATION RATE: 18 BRPM | TEMPERATURE: 98 F | OXYGEN SATURATION: 97 % | HEART RATE: 74 BPM

## 2024-10-14 PROCEDURE — 99283 EMERGENCY DEPT VISIT LOW MDM: CPT

## 2024-10-14 PROCEDURE — 99282 EMERGENCY DEPT VISIT SF MDM: CPT

## 2024-10-14 RX ORDER — CIPROFLOXACIN HYDROCHLORIDE 3.5 MG/ML
2 SOLUTION OPHTHALMIC
Qty: 1 | Refills: 0
Start: 2024-10-14 | End: 2024-10-20

## 2024-10-14 RX ORDER — FLUORESCEIN SODIUM 100 %
1 POWDER (GRAM) MISCELLANEOUS ONCE
Refills: 0 | Status: DISCONTINUED | OUTPATIENT
Start: 2024-10-14 | End: 2024-10-21

## 2024-10-14 RX ORDER — TETRACAINE HCL 0.5 %
2 DROPS OPHTHALMIC (EYE) ONCE
Refills: 0 | Status: DISCONTINUED | OUTPATIENT
Start: 2024-10-14 | End: 2024-10-21

## 2024-10-14 RX ORDER — ACETAMINOPHEN 325 MG
975 TABLET ORAL ONCE
Refills: 0 | Status: COMPLETED | OUTPATIENT
Start: 2024-10-14 | End: 2024-10-14

## 2024-10-14 RX ADMIN — Medication 975 MILLIGRAM(S): at 14:41

## 2024-10-14 NOTE — ED PROVIDER NOTE - CLINICAL SUMMARY MEDICAL DECISION MAKING FREE TEXT BOX
63-year-old  female presents to ED with 2-day history of left eye redness and pain.  Patient also admits to light sensitivity but denies any visual disturbances.  Patient explained that she noticed redness to her eyes x 2 days worsening with swelling x 1 day.  Patient explained that she was seen in urgent care where she was told to come to the ED for appropriate evaluation and management. patient denies any segment past medical or surgical illness.  Patient denies any discharge from the eye but admits to pain with eye movement.    Pt Eye examination + 20/20 with Snellen chart . Visual fields are full by confrontation. Extraocular  movements intact and full with no nystagmus. Lids are symmetric with no ptosis. Conjunctiva pink, sclera white. Cornea clear, Reflex intact, PERRLA.  Pt eye was examined using Tetracaine 0.5% and Fluorescein strips. Mathews Lamps examination shows + uptake noted.  Eye Pressure 16mmHg

## 2024-10-14 NOTE — ED PROVIDER NOTE - PHYSICAL EXAMINATION
Pt Eye examination + 20/20 with Snellen chart . Visual fields are full by confrontation. Extraocular  movements intact and full with no nystagmus. Lids are symmetric with no ptosis. Conjunctiva pink, sclera white. Cornea clear, Reflex intact, PERRLA.  Pt eye was examined using Tetracaine 0.5% and Fluorescein strips. Mathews Lamps examination shows + uptake noted.

## 2024-10-14 NOTE — ED ADULT TRIAGE NOTE - CHIEF COMPLAINT QUOTE
HA x 3 days, left eye pain/pressure, redness and tearing x 1 day. Pt denies any injury to eye, vision changes. WALT

## 2024-10-14 NOTE — ED PROVIDER NOTE - GASTROINTESTINAL, MLM
Abdomen soft, non-tender, no guarding. Bi-Rhombic Flap Text: The defect edges were debeveled with a #15 scalpel blade.  Given the location of the defect and the proximity to free margins a bi-rhombic flap was deemed most appropriate.  Using a sterile surgical marker, an appropriate rhombic flap was drawn incorporating the defect. The area thus outlined was incised deep to adipose tissue with a #15 scalpel blade.  The skin margins were undermined to an appropriate distance in all directions utilizing iris scissors.

## 2024-10-14 NOTE — ED PROVIDER NOTE - PATIENT PORTAL LINK FT
You can access the FollowMyHealth Patient Portal offered by [facility] by registering at the following website: [email or web address]. By joining [email or web address]’s FollowMyHealth portal, you will also be able to view your health information using other applications (apps) compatible with our system.

## 2024-10-14 NOTE — ED PROVIDER NOTE - NSPTACCESSSVCSAPPTDETAILS_ED_ALL_ED_FT
The above-named patient was seen in the ED for left eye pain and redness.  On examination corneal abrasion was noted.  Please schedule patient to be seen by ophthalmology within the next 24 hours thank you

## 2024-10-14 NOTE — ED PROVIDER NOTE - CARDIOVASCULAR NEGATIVE STATEMENT, MLM
no chest pain and no edema.
I will SWITCH the dose or number of times a day I take the medications listed below when I get home from the hospital:  None

## 2024-10-14 NOTE — ED PROVIDER NOTE - CARE PROVIDER_API CALL
Jos Wayne  Ophthalmology  [address] [place] [address]  Phone: [phone]  Fax: [phone]  Follow Up Time: Urgent

## 2024-10-14 NOTE — ED PROVIDER NOTE - NSFOLLOWUPINSTRUCTIONS_ED_ALL_ED_FT
continue medication as prescribed  Please follow-up ophthalmology as discussed  Return to ED if change of vision to occur prior to follow-up with ophthalmologist.

## 2024-10-14 NOTE — ED PROVIDER NOTE - ATTENDING APP SHARED VISIT CONTRIBUTION OF CARE
indep eval  L eye red tearing mild chemosis  pt c/o pressure sensation  will obtain pressure, r/o abrasion to cornea  imaging as indicated  agree w plan

## 2024-10-15 ENCOUNTER — OFFICE (OUTPATIENT)
Dept: URBAN - METROPOLITAN AREA CLINIC 113 | Facility: CLINIC | Age: 64
Setting detail: OPHTHALMOLOGY
End: 2024-10-15
Payer: COMMERCIAL

## 2024-10-15 ENCOUNTER — RX ONLY (RX ONLY)
Age: 64
End: 2024-10-15

## 2024-10-15 DIAGNOSIS — S05.02XA: ICD-10-CM

## 2024-10-15 PROCEDURE — 92004 COMPRE OPH EXAM NEW PT 1/>: CPT | Performed by: OPTOMETRIST

## 2024-10-15 ASSESSMENT — KERATOMETRY
OS_K1POWER_DIOPTERS: 44.75
OS_AXISANGLE_DEGREES: 044
OD_K1POWER_DIOPTERS: 44.50
OD_AXISANGLE_DEGREES: 001
OS_K2POWER_DIOPTERS: 45.00
OD_K2POWER_DIOPTERS: 45.00

## 2024-10-15 ASSESSMENT — VISUAL ACUITY
OS_BCVA: 20/25
OD_BCVA: 20/30-

## 2024-10-15 ASSESSMENT — CORNEAL TRAUMA - FOREIGN BODY: OS_FOREIGNBODY: ABSENT

## 2024-10-15 ASSESSMENT — TONOMETRY
OS_IOP_MMHG: 18
OD_IOP_MMHG: 18

## 2024-10-15 ASSESSMENT — REFRACTION_AUTOREFRACTION
OS_AXIS: 104
OD_SPHERE: PLANO
OS_CYLINDER: -0.75
OD_AXIS: 092
OS_SPHERE: -1.25
OD_CYLINDER: -1.75

## 2024-10-15 ASSESSMENT — CONFRONTATIONAL VISUAL FIELD TEST (CVF)
OS_FINDINGS: FULL
OD_FINDINGS: FULL

## 2024-10-15 ASSESSMENT — CORNEAL TRAUMA - ABRASION: OS_ABRASION: ABSENT

## 2024-10-16 ENCOUNTER — NON-APPOINTMENT (OUTPATIENT)
Age: 64
End: 2024-10-16

## 2024-10-16 ENCOUNTER — APPOINTMENT (OUTPATIENT)
Dept: INTERNAL MEDICINE | Facility: CLINIC | Age: 64
End: 2024-10-16
Payer: COMMERCIAL

## 2024-10-16 VITALS
WEIGHT: 166 LBS | OXYGEN SATURATION: 92 % | HEIGHT: 68 IN | HEART RATE: 96 BPM | BODY MASS INDEX: 25.16 KG/M2 | SYSTOLIC BLOOD PRESSURE: 125 MMHG | DIASTOLIC BLOOD PRESSURE: 60 MMHG

## 2024-10-16 DIAGNOSIS — G47.33 OBSTRUCTIVE SLEEP APNEA (ADULT) (PEDIATRIC): ICD-10-CM

## 2024-10-16 DIAGNOSIS — R73.03 PREDIABETES.: ICD-10-CM

## 2024-10-16 DIAGNOSIS — Z00.00 ENCOUNTER FOR GENERAL ADULT MEDICAL EXAMINATION W/OUT ABNORMAL FINDINGS: ICD-10-CM

## 2024-10-16 DIAGNOSIS — F17.210 NICOTINE DEPENDENCE, CIGARETTES, UNCOMPLICATED: ICD-10-CM

## 2024-10-16 DIAGNOSIS — K21.9 GASTRO-ESOPHAGEAL REFLUX DISEASE W/OUT ESOPHAGITIS: ICD-10-CM

## 2024-10-16 DIAGNOSIS — Z12.11 ENCOUNTER FOR SCREENING FOR MALIGNANT NEOPLASM OF COLON: ICD-10-CM

## 2024-10-16 PROCEDURE — G0008: CPT

## 2024-10-16 PROCEDURE — 99396 PREV VISIT EST AGE 40-64: CPT | Mod: 25

## 2024-10-16 PROCEDURE — 90656 IIV3 VACC NO PRSV 0.5 ML IM: CPT

## 2024-10-16 PROCEDURE — 36415 COLL VENOUS BLD VENIPUNCTURE: CPT

## 2024-10-19 ENCOUNTER — OFFICE (OUTPATIENT)
Dept: URBAN - METROPOLITAN AREA CLINIC 105 | Facility: CLINIC | Age: 64
Setting detail: OPHTHALMOLOGY
End: 2024-10-19
Payer: COMMERCIAL

## 2024-10-19 ENCOUNTER — RX ONLY (RX ONLY)
Age: 64
End: 2024-10-19

## 2024-10-19 DIAGNOSIS — S05.02XD: ICD-10-CM

## 2024-10-19 PROCEDURE — 92012 INTRM OPH EXAM EST PATIENT: CPT | Performed by: OPTOMETRIST

## 2024-10-19 ASSESSMENT — REFRACTION_AUTOREFRACTION
OD_CYLINDER: -1.00
OS_AXIS: 114
OD_AXIS: 089
OS_CYLINDER: -0.75
OS_SPHERE: -1.50
OD_SPHERE: -0.50

## 2024-10-19 ASSESSMENT — CORNEAL TRAUMA - ABRASION: OS_ABRASION: ABSENT

## 2024-10-19 ASSESSMENT — KERATOMETRY
OD_K1POWER_DIOPTERS: 45.00
OS_K2POWER_DIOPTERS: 45.75
OS_K1POWER_DIOPTERS: 45.25
OS_AXISANGLE_DEGREES: 062
OD_AXISANGLE_DEGREES: 090
OD_K2POWER_DIOPTERS: 45.00

## 2024-10-19 ASSESSMENT — TONOMETRY
OD_IOP_MMHG: 21
OS_IOP_MMHG: 21

## 2024-10-19 ASSESSMENT — CONFRONTATIONAL VISUAL FIELD TEST (CVF)
OD_FINDINGS: FULL
OS_FINDINGS: FULL

## 2024-10-19 ASSESSMENT — VISUAL ACUITY
OD_BCVA: 20/20-
OS_BCVA: 20/20

## 2024-10-19 ASSESSMENT — CORNEAL TRAUMA - FOREIGN BODY: OS_FOREIGNBODY: ABSENT

## 2024-10-22 DIAGNOSIS — E03.9 HYPOTHYROIDISM, UNSPECIFIED: ICD-10-CM

## 2024-10-22 DIAGNOSIS — E78.5 HYPERLIPIDEMIA, UNSPECIFIED: ICD-10-CM

## 2024-10-22 LAB
ALBUMIN SERPL ELPH-MCNC: 4.7 G/DL
ALP BLD-CCNC: 107 U/L
ALT SERPL-CCNC: 18 U/L
ANION GAP SERPL CALC-SCNC: 14 MMOL/L
AST SERPL-CCNC: 15 U/L
BASOPHILS # BLD AUTO: 0.03 K/UL
BASOPHILS NFR BLD AUTO: 0.4 %
BILIRUB SERPL-MCNC: <0.2 MG/DL
BUN SERPL-MCNC: 15 MG/DL
CALCIUM SERPL-MCNC: 9.3 MG/DL
CHLORIDE SERPL-SCNC: 104 MMOL/L
CHOLEST SERPL-MCNC: 251 MG/DL
CO2 SERPL-SCNC: 24 MMOL/L
CREAT SERPL-MCNC: 0.97 MG/DL
EGFR: 66 ML/MIN/1.73M2
EOSINOPHIL # BLD AUTO: 0.11 K/UL
EOSINOPHIL NFR BLD AUTO: 1.6 %
ESTIMATED AVERAGE GLUCOSE: 126 MG/DL
GLUCOSE SERPL-MCNC: 98 MG/DL
HBA1C MFR BLD HPLC: 6 %
HCT VFR BLD CALC: 41.3 %
HDLC SERPL-MCNC: 32 MG/DL
HGB BLD-MCNC: 13.5 G/DL
IMM GRANULOCYTES NFR BLD AUTO: 0.3 %
LDLC SERPL CALC-MCNC: 118 MG/DL
LYMPHOCYTES # BLD AUTO: 2.17 K/UL
LYMPHOCYTES NFR BLD AUTO: 31.2 %
MAN DIFF?: NORMAL
MCHC RBC-ENTMCNC: 32.7 GM/DL
MCHC RBC-ENTMCNC: 33.4 PG
MCV RBC AUTO: 102.2 FL
MONOCYTES # BLD AUTO: 0.4 K/UL
MONOCYTES NFR BLD AUTO: 5.7 %
NEUTROPHILS # BLD AUTO: 4.23 K/UL
NEUTROPHILS NFR BLD AUTO: 60.8 %
NONHDLC SERPL-MCNC: 219 MG/DL
PLATELET # BLD AUTO: 230 K/UL
POTASSIUM SERPL-SCNC: 4.1 MMOL/L
PROT SERPL-MCNC: 6.9 G/DL
RBC # BLD: 4.04 M/UL
RBC # FLD: 13.1 %
SODIUM SERPL-SCNC: 142 MMOL/L
T4 FREE SERPL-MCNC: 0.9 NG/DL
TRIGL SERPL-MCNC: 565 MG/DL
TSH SERPL-ACNC: 8.61 UIU/ML
WBC # FLD AUTO: 6.96 K/UL

## 2024-10-22 RX ORDER — LEVOTHYROXINE SODIUM 0.03 MG/1
25 TABLET ORAL
Qty: 90 | Refills: 1 | Status: ACTIVE | COMMUNITY
Start: 2024-10-22 | End: 1900-01-01

## 2024-10-29 ENCOUNTER — TRANSCRIPTION ENCOUNTER (OUTPATIENT)
Age: 64
End: 2024-10-29

## 2024-11-26 NOTE — ED PROVIDER NOTE - MDM ORDERS SUBMITTED SELECTION
50-year-old woman with a history of obesity and hypertension who presents for screening colonoscopy.  Denies abdominal pain, unintentional weight loss, black or bloody stools, nausea vomiting.  No family history of colorectal cancer.  No blood thinners or weight loss medications.  Denies abdominal surgeries.  Has had recent PVCs seen on an emergency room visit and has cardiologist appointment tomorrow. Medications

## 2024-11-29 ENCOUNTER — NON-APPOINTMENT (OUTPATIENT)
Age: 64
End: 2024-11-29

## 2025-01-24 ENCOUNTER — TRANSCRIPTION ENCOUNTER (OUTPATIENT)
Age: 65
End: 2025-01-24

## 2025-03-03 ENCOUNTER — OFFICE (OUTPATIENT)
Dept: URBAN - METROPOLITAN AREA CLINIC 113 | Facility: CLINIC | Age: 65
Setting detail: OPHTHALMOLOGY
End: 2025-03-03
Payer: COMMERCIAL

## 2025-03-03 DIAGNOSIS — H25.13: ICD-10-CM

## 2025-03-03 DIAGNOSIS — H52.13: ICD-10-CM

## 2025-03-03 PROBLEM — H35.40 PERIPHERAL RETINAL DEGENERATION UNSPECIFIED: Status: ACTIVE | Noted: 2025-03-03

## 2025-03-03 PROCEDURE — 92015 DETERMINE REFRACTIVE STATE: CPT | Performed by: OPTOMETRIST

## 2025-03-03 PROCEDURE — 92250 FUNDUS PHOTOGRAPHY W/I&R: CPT | Performed by: OPTOMETRIST

## 2025-03-03 PROCEDURE — 92014 COMPRE OPH EXAM EST PT 1/>: CPT | Performed by: OPTOMETRIST

## 2025-03-03 ASSESSMENT — KERATOMETRY
OD_AXISANGLE_DEGREES: 171
OD_K2POWER_DIOPTERS: 45.00
OS_AXISANGLE_DEGREES: 040
OD_K1POWER_DIOPTERS: 44.50
OS_K1POWER_DIOPTERS: 44.75
OS_K2POWER_DIOPTERS: 45.25

## 2025-03-03 ASSESSMENT — REFRACTION_MANIFEST
OS_SPHERE: -1.50
OD_SPHERE: -0.50
OS_AXIS: 105
OD_VA1: 20/20
OD_AXIS: 085
OD_CYLINDER: -0.75
OS_VA1: 20/20
OS_CYLINDER: -0.75

## 2025-03-03 ASSESSMENT — VISUAL ACUITY
OS_BCVA: 20/25+1
OD_BCVA: 20/25+1

## 2025-03-03 ASSESSMENT — REFRACTION_AUTOREFRACTION
OS_CYLINDER: -1.25
OS_AXIS: 102
OD_SPHERE: PLANO
OD_CYLINDER: -1.50
OS_SPHERE: -1.25
OD_AXIS: 088

## 2025-03-03 ASSESSMENT — PACHYMETRY
OS_CT_CORRECTION: -1
OS_CT_UM: 550
OD_CT_CORRECTION: -1
OD_CT_UM: 550

## 2025-03-03 ASSESSMENT — TONOMETRY
OD_IOP_MMHG: 20
OD_IOP_MMHG: 19
OS_IOP_MMHG: 20

## 2025-03-03 ASSESSMENT — REFRACTION_CURRENTRX
OD_SPHERE: -1.00
OD_VPRISM_DIRECTION: SV
OS_CYLINDER: SPH
OD_OVR_VA: 20/
OS_SPHERE: -2.00
OS_OVR_VA: 20/
OD_CYLINDER: -0.50
OS_VPRISM_DIRECTION: SV
OD_AXIS: 087

## 2025-03-03 ASSESSMENT — CONFRONTATIONAL VISUAL FIELD TEST (CVF)
OS_FINDINGS: FULL
OD_FINDINGS: FULL

## 2025-03-24 ENCOUNTER — NON-APPOINTMENT (OUTPATIENT)
Age: 65
End: 2025-03-24

## 2025-04-22 ENCOUNTER — APPOINTMENT (OUTPATIENT)
Dept: INTERNAL MEDICINE | Facility: CLINIC | Age: 65
End: 2025-04-22
Payer: COMMERCIAL

## 2025-04-22 VITALS
HEART RATE: 94 BPM | BODY MASS INDEX: 25.01 KG/M2 | DIASTOLIC BLOOD PRESSURE: 70 MMHG | HEIGHT: 68 IN | OXYGEN SATURATION: 96 % | SYSTOLIC BLOOD PRESSURE: 105 MMHG | WEIGHT: 165 LBS

## 2025-04-22 DIAGNOSIS — E78.5 HYPERLIPIDEMIA, UNSPECIFIED: ICD-10-CM

## 2025-04-22 DIAGNOSIS — R73.03 PREDIABETES.: ICD-10-CM

## 2025-04-22 DIAGNOSIS — K21.9 GASTRO-ESOPHAGEAL REFLUX DISEASE W/OUT ESOPHAGITIS: ICD-10-CM

## 2025-04-22 DIAGNOSIS — G47.33 OBSTRUCTIVE SLEEP APNEA (ADULT) (PEDIATRIC): ICD-10-CM

## 2025-04-22 DIAGNOSIS — E03.9 HYPOTHYROIDISM, UNSPECIFIED: ICD-10-CM

## 2025-04-22 DIAGNOSIS — F17.210 NICOTINE DEPENDENCE, CIGARETTES, UNCOMPLICATED: ICD-10-CM

## 2025-04-22 PROCEDURE — 99214 OFFICE O/P EST MOD 30 MIN: CPT

## 2025-04-22 PROCEDURE — 36415 COLL VENOUS BLD VENIPUNCTURE: CPT

## 2025-04-22 PROCEDURE — G2211 COMPLEX E/M VISIT ADD ON: CPT | Mod: NC

## 2025-04-23 LAB
ALBUMIN SERPL ELPH-MCNC: 4.7 G/DL
ALP BLD-CCNC: 116 U/L
ALT SERPL-CCNC: 25 U/L
ANION GAP SERPL CALC-SCNC: 14 MMOL/L
AST SERPL-CCNC: 15 U/L
BASOPHILS # BLD AUTO: 0.03 K/UL
BASOPHILS NFR BLD AUTO: 0.5 %
BILIRUB SERPL-MCNC: 0.3 MG/DL
BUN SERPL-MCNC: 17 MG/DL
CALCIUM SERPL-MCNC: 9.7 MG/DL
CHLORIDE SERPL-SCNC: 105 MMOL/L
CHOLEST SERPL-MCNC: 197 MG/DL
CO2 SERPL-SCNC: 24 MMOL/L
CREAT SERPL-MCNC: 0.78 MG/DL
EGFRCR SERPLBLD CKD-EPI 2021: 85 ML/MIN/1.73M2
EOSINOPHIL # BLD AUTO: 0.1 K/UL
EOSINOPHIL NFR BLD AUTO: 1.7 %
ESTIMATED AVERAGE GLUCOSE: 137 MG/DL
GLUCOSE SERPL-MCNC: 120 MG/DL
HBA1C MFR BLD HPLC: 6.4 %
HCT VFR BLD CALC: 44.3 %
HDLC SERPL-MCNC: 38 MG/DL
HGB BLD-MCNC: 13.7 G/DL
IMM GRANULOCYTES NFR BLD AUTO: 0.2 %
LDLC SERPL-MCNC: 121 MG/DL
LYMPHOCYTES # BLD AUTO: 1.83 K/UL
LYMPHOCYTES NFR BLD AUTO: 30.7 %
MAN DIFF?: NORMAL
MCHC RBC-ENTMCNC: 30.9 G/DL
MCHC RBC-ENTMCNC: 31.1 PG
MCV RBC AUTO: 100.5 FL
MONOCYTES # BLD AUTO: 0.31 K/UL
MONOCYTES NFR BLD AUTO: 5.2 %
NEUTROPHILS # BLD AUTO: 3.69 K/UL
NEUTROPHILS NFR BLD AUTO: 61.7 %
NONHDLC SERPL-MCNC: 159 MG/DL
PLATELET # BLD AUTO: 242 K/UL
POTASSIUM SERPL-SCNC: 4.6 MMOL/L
PROT SERPL-MCNC: 7.1 G/DL
RBC # BLD: 4.41 M/UL
RBC # FLD: 14.2 %
SODIUM SERPL-SCNC: 144 MMOL/L
T4 FREE SERPL-MCNC: 2.2 NG/DL
TRIGL SERPL-MCNC: 215 MG/DL
TSH SERPL-ACNC: <0.01 UIU/ML
WBC # FLD AUTO: 5.97 K/UL

## 2025-05-12 ENCOUNTER — TRANSCRIPTION ENCOUNTER (OUTPATIENT)
Age: 65
End: 2025-05-12

## 2025-05-13 ENCOUNTER — TRANSCRIPTION ENCOUNTER (OUTPATIENT)
Age: 65
End: 2025-05-13

## 2025-05-13 DIAGNOSIS — R51.9 HEADACHE, UNSPECIFIED: ICD-10-CM

## 2025-05-30 ENCOUNTER — TRANSCRIPTION ENCOUNTER (OUTPATIENT)
Age: 65
End: 2025-05-30

## 2025-06-02 ENCOUNTER — TRANSCRIPTION ENCOUNTER (OUTPATIENT)
Age: 65
End: 2025-06-02

## 2025-08-11 ENCOUNTER — OFFICE (OUTPATIENT)
Dept: URBAN - METROPOLITAN AREA CLINIC 113 | Facility: CLINIC | Age: 65
Setting detail: OPHTHALMOLOGY
End: 2025-08-11
Payer: COMMERCIAL

## 2025-08-11 DIAGNOSIS — E05.01: ICD-10-CM

## 2025-08-11 PROCEDURE — 99215 OFFICE O/P EST HI 40 MIN: CPT | Performed by: OPTOMETRIST

## 2025-08-11 ASSESSMENT — REFRACTION_MANIFEST
OS_CYLINDER: -0.75
OD_CYLINDER: -0.75
OD_VA1: 20/20
OS_VA1: 20/20
OD_AXIS: 085
OS_AXIS: 105
OS_SPHERE: -1.50
OD_SPHERE: -0.50

## 2025-08-11 ASSESSMENT — REFRACTION_CURRENTRX
OS_SPHERE: -2.00
OS_VPRISM_DIRECTION: SV
OD_OVR_VA: 20/
OD_SPHERE: -1.00
OD_VPRISM_DIRECTION: SV
OS_OVR_VA: 20/
OD_CYLINDER: -0.50
OS_CYLINDER: SPH
OD_AXIS: 087

## 2025-08-11 ASSESSMENT — TONOMETRY
OS_IOP_MMHG: 20
OD_IOP_MMHG: 17

## 2025-08-11 ASSESSMENT — KERATOMETRY
OD_K2POWER_DIOPTERS: 44.75
OD_K1POWER_DIOPTERS: 44.50
OS_K2POWER_DIOPTERS: 45.25
OD_AXISANGLE_DEGREES: 169
OS_K1POWER_DIOPTERS: 44.75
OS_AXISANGLE_DEGREES: 045

## 2025-08-11 ASSESSMENT — PACHYMETRY
OS_CT_UM: 550
OD_CT_CORRECTION: -1
OS_CT_CORRECTION: -1
OD_CT_UM: 550

## 2025-08-11 ASSESSMENT — REFRACTION_AUTOREFRACTION
OD_AXIS: 088
OD_SPHERE: PLANO
OD_CYLINDER: -1.25
OS_SPHERE: -1.50
OS_CYLINDER: -0.75
OS_AXIS: 113

## 2025-08-11 ASSESSMENT — VISUAL ACUITY
OD_BCVA: 20/20
OS_BCVA: 20/20-1

## 2025-08-11 ASSESSMENT — CONFRONTATIONAL VISUAL FIELD TEST (CVF)
OD_FINDINGS: FULL
OS_FINDINGS: FULL

## 2025-08-12 ENCOUNTER — RX ONLY (RX ONLY)
Age: 65
End: 2025-08-12

## 2025-08-12 ENCOUNTER — OFFICE (OUTPATIENT)
Dept: URBAN - METROPOLITAN AREA CLINIC 38 | Facility: CLINIC | Age: 65
Setting detail: OPHTHALMOLOGY
End: 2025-08-12
Payer: COMMERCIAL

## 2025-08-12 DIAGNOSIS — E05.00: ICD-10-CM

## 2025-08-12 PROCEDURE — 99214 OFFICE O/P EST MOD 30 MIN: CPT | Performed by: STUDENT IN AN ORGANIZED HEALTH CARE EDUCATION/TRAINING PROGRAM

## 2025-08-12 ASSESSMENT — REFRACTION_AUTOREFRACTION
OS_AXIS: 113
OD_CYLINDER: -1.25
OD_AXIS: 088
OS_SPHERE: -1.50
OD_SPHERE: PLANO
OS_CYLINDER: -0.75

## 2025-08-12 ASSESSMENT — LID EXAM ASSESSMENTS
OS_MRD1: 6 MM
OS_MRD2: 6 MM
OD_MRD2: 5.5MM
OD_MRD1: 5.5MM

## 2025-08-12 ASSESSMENT — VISUAL ACUITY
OD_BCVA: 20/20-2
OS_BCVA: 20/20

## 2025-08-12 ASSESSMENT — KERATOMETRY
OS_K2POWER_DIOPTERS: 45.25
OS_K1POWER_DIOPTERS: 44.75
OS_AXISANGLE_DEGREES: 045
OD_AXISANGLE_DEGREES: 169
OD_K2POWER_DIOPTERS: 44.75
OD_K1POWER_DIOPTERS: 44.50

## 2025-08-12 ASSESSMENT — CONFRONTATIONAL VISUAL FIELD TEST (CVF)
OD_FINDINGS: FULL
OS_FINDINGS: FULL

## 2025-08-14 ENCOUNTER — OFFICE (OUTPATIENT)
Dept: URBAN - METROPOLITAN AREA CLINIC 105 | Facility: CLINIC | Age: 65
Setting detail: OPHTHALMOLOGY
End: 2025-08-14
Payer: COMMERCIAL

## 2025-08-14 DIAGNOSIS — H90.3: ICD-10-CM

## 2025-08-14 PROCEDURE — 92567 TYMPANOMETRY: CPT | Performed by: AUDIOLOGIST-HEARING AID FITTER

## 2025-08-14 PROCEDURE — 92557 COMPREHENSIVE HEARING TEST: CPT | Performed by: AUDIOLOGIST-HEARING AID FITTER

## 2025-08-28 DIAGNOSIS — E05.00 THYROTOXICOSIS WITH DIFFUSE GOITER W/OUT THYROTOXIC CRISIS OR STORM: ICD-10-CM

## 2025-08-28 LAB
T3FREE SERPL-MCNC: 3.3 PG/ML
T4 FREE SERPL-MCNC: 1.8 NG/DL
TSH SERPL-ACNC: <0.01 UIU/ML

## 2025-09-02 ENCOUNTER — TRANSCRIPTION ENCOUNTER (OUTPATIENT)
Age: 65
End: 2025-09-02

## 2025-09-09 ENCOUNTER — TRANSCRIPTION ENCOUNTER (OUTPATIENT)
Age: 65
End: 2025-09-09

## (undated) DEVICE — DRSG STERISTRIPS 0.5X4"

## (undated) DEVICE — SYR LUER LOK 10CC

## (undated) DEVICE — SOL BETADINE PRO IOD 4OZ

## (undated) DEVICE — DRAPE SPLIT SHEETS 77X108"

## (undated) DEVICE — SUT MONOCRYL 5-0 18" P-3

## (undated) DEVICE — NDL HYPO SAFE 25G X 5/8"

## (undated) DEVICE — APPLICATOR COTTON TIP 3" STERILE

## (undated) DEVICE — SUT PLAIN GUT FAST ABSORBING 5-0 PC-1

## (undated) DEVICE — DRAPE MAGNETIC INSTRUMENT MEDIUM

## (undated) DEVICE — DRILL BIT STRYKER CRANIOMAXILLOFACIAL .9X4MM

## (undated) DEVICE — BLANKET WARMER LOWER ADULT

## (undated) DEVICE — ELCTR BOVIE NEEDLE TIP 2.84"

## (undated) DEVICE — PACK GENERAL MINOR

## (undated) DEVICE — MARKER SKIN MULTI TIP 6"

## (undated) DEVICE — SUT SILK 4-0 18" PS-2

## (undated) DEVICE — DRSG TEGADERM 6"X8"

## (undated) DEVICE — WRAP COMPRESSION CALF LG

## (undated) DEVICE — DRAPE INSTRUMENT POUCH

## (undated) DEVICE — DRSG PAD EYE OVAL

## (undated) DEVICE — STAPLER SKIN VISI-STAT 35 WIDE

## (undated) DEVICE — DRSG MASTISOL

## (undated) DEVICE — SPONGE PATTY X-RAY 0.5X3"

## (undated) DEVICE — SPEAR SURG EYE WECK-CELL CELOS

## (undated) DEVICE — POSITIONER FOAM EGG CRATE ULNAR (2PCS)

## (undated) DEVICE — DRAPE 3/4 SHEET W REINFORCEMENT 56X77"

## (undated) DEVICE — DRAPE TOWEL BLUE 17" X 24"

## (undated) DEVICE — Device

## (undated) DEVICE — DRSG OPSITE POSTOP 13.75"X4"

## (undated) DEVICE — DRAPE HALF SHEET 40X57"

## (undated) DEVICE — FRAZIER SUCTION TIP 10FR

## (undated) DEVICE — SPONGE PATTY NEURO 1/4 X 3IN

## (undated) DEVICE — MEDICATION LABELS W MARKER

## (undated) DEVICE — SUT PLAIN GUT 5-0 18" P-3

## (undated) DEVICE — SUT SILK 4-0 18" P-3